# Patient Record
Sex: MALE | Race: BLACK OR AFRICAN AMERICAN | NOT HISPANIC OR LATINO | Employment: FULL TIME | URBAN - METROPOLITAN AREA
[De-identification: names, ages, dates, MRNs, and addresses within clinical notes are randomized per-mention and may not be internally consistent; named-entity substitution may affect disease eponyms.]

---

## 2018-03-21 ENCOUNTER — OFFICE VISIT (OUTPATIENT)
Dept: URGENT CARE | Facility: CLINIC | Age: 28
End: 2018-03-21
Payer: COMMERCIAL

## 2018-03-21 VITALS
BODY MASS INDEX: 27.47 KG/M2 | WEIGHT: 175 LBS | RESPIRATION RATE: 16 BRPM | OXYGEN SATURATION: 99 % | HEART RATE: 76 BPM | TEMPERATURE: 97 F | SYSTOLIC BLOOD PRESSURE: 128 MMHG | DIASTOLIC BLOOD PRESSURE: 75 MMHG | HEIGHT: 67 IN

## 2018-03-21 DIAGNOSIS — H61.23 BILATERAL IMPACTED CERUMEN: Primary | ICD-10-CM

## 2018-03-21 DIAGNOSIS — H60.339 ACUTE SWIMMER'S EAR, UNSPECIFIED LATERALITY: ICD-10-CM

## 2018-03-21 PROCEDURE — 96372 THER/PROPH/DIAG INJ SC/IM: CPT | Mod: S$GLB,,, | Performed by: INTERNAL MEDICINE

## 2018-03-21 PROCEDURE — 99213 OFFICE O/P EST LOW 20 MIN: CPT | Mod: 25,S$GLB,, | Performed by: INTERNAL MEDICINE

## 2018-03-21 RX ORDER — NEOMYCIN SULFATE, POLYMYXIN B SULFATE, HYDROCORTISONE 3.5; 10000; 1 MG/ML; [USP'U]/ML; MG/ML
4 SOLUTION/ DROPS AURICULAR (OTIC) EVERY 6 HOURS
Qty: 10 ML | Refills: 1 | Status: SHIPPED | OUTPATIENT
Start: 2018-03-21 | End: 2018-03-28

## 2018-03-21 RX ORDER — BETAMETHASONE SODIUM PHOSPHATE AND BETAMETHASONE ACETATE 3; 3 MG/ML; MG/ML
9 INJECTION, SUSPENSION INTRA-ARTICULAR; INTRALESIONAL; INTRAMUSCULAR; SOFT TISSUE ONCE
Status: COMPLETED | OUTPATIENT
Start: 2018-03-21 | End: 2018-03-21

## 2018-03-21 RX ADMIN — BETAMETHASONE SODIUM PHOSPHATE AND BETAMETHASONE ACETATE 9 MG: 3; 3 INJECTION, SUSPENSION INTRA-ARTICULAR; INTRALESIONAL; INTRAMUSCULAR; SOFT TISSUE at 07:03

## 2018-03-22 NOTE — PROGRESS NOTES
"Subjective:       Patient ID: Yash Miguel is a 27 y.o. male.    Vitals:  height is 5' 7" (1.702 m) and weight is 79.4 kg (175 lb). His temperature is 97.2 °F (36.2 °C). His blood pressure is 128/75 and his pulse is 76. His respiration is 16 and oxygen saturation is 99%.     Chief Complaint: Otalgia    Pt states headache near his left ear and believes that he may have an ear infection causing it      Otalgia    There is pain in the left ear. This is a new problem. The current episode started yesterday. Associated symptoms include ear discharge and headaches. Pertinent negatives include no abdominal pain, diarrhea, rash, sore throat or vomiting.     Review of Systems   Constitution: Negative for chills and fever.   HENT: Positive for ear discharge and ear pain. Negative for sore throat.    Eyes: Negative for blurred vision.   Cardiovascular: Negative for chest pain.   Respiratory: Negative for shortness of breath.    Skin: Negative for rash.   Musculoskeletal: Negative for back pain and joint pain.   Gastrointestinal: Negative for abdominal pain, diarrhea, nausea and vomiting.   Neurological: Positive for headaches.   Psychiatric/Behavioral: The patient is not nervous/anxious.        Objective:      Physical Exam   Constitutional: He appears well-developed and well-nourished.   HENT:   Head: Normocephalic and atraumatic.   Bilateral cerumen impactions; after cerumen removed both canals with swelling;erythema and exudate L>>R   Eyes: Conjunctivae and EOM are normal. Pupils are equal, round, and reactive to light.   Neck: Normal range of motion. Neck supple.   Nursing note and vitals reviewed.      Assessment:       1. Bilateral impacted cerumen    2. Acute swimmer's ear, unspecified laterality        Plan:         Bilateral impacted cerumen    Acute swimmer's ear, unspecified laterality  -     neomycin-polymyxin-hydrocortisone (CORTISPORIN) otic solution; Place 4 drops into both ears every 6 (six) hours.  Dispense: " 10 mL; Refill: 1  -     betamethasone acetate-betamethasone sodium phosphate injection 9 mg; Inject 1.5 mLs (9 mg total) into the muscle once.

## 2018-04-03 ENCOUNTER — OFFICE VISIT (OUTPATIENT)
Dept: URGENT CARE | Facility: CLINIC | Age: 28
End: 2018-04-03
Payer: COMMERCIAL

## 2018-04-03 VITALS
HEIGHT: 67 IN | BODY MASS INDEX: 27.47 KG/M2 | WEIGHT: 175 LBS | DIASTOLIC BLOOD PRESSURE: 69 MMHG | RESPIRATION RATE: 18 BRPM | HEART RATE: 93 BPM | SYSTOLIC BLOOD PRESSURE: 127 MMHG | TEMPERATURE: 97 F | OXYGEN SATURATION: 100 %

## 2018-04-03 DIAGNOSIS — H60.92 OTITIS EXTERNA OF LEFT EAR, UNSPECIFIED CHRONICITY, UNSPECIFIED TYPE: Primary | ICD-10-CM

## 2018-04-03 PROCEDURE — 99214 OFFICE O/P EST MOD 30 MIN: CPT | Mod: S$GLB,,, | Performed by: FAMILY MEDICINE

## 2018-04-03 RX ORDER — CIPROFLOXACIN AND DEXAMETHASONE 3; 1 MG/ML; MG/ML
SUSPENSION/ DROPS AURICULAR (OTIC)
Qty: 7.5 ML | Refills: 0 | Status: SHIPPED | OUTPATIENT
Start: 2018-04-03 | End: 2018-04-10

## 2018-04-03 RX ORDER — AMOXICILLIN AND CLAVULANATE POTASSIUM 875; 125 MG/1; MG/1
1 TABLET, FILM COATED ORAL 2 TIMES DAILY
Qty: 20 TABLET | Refills: 0 | Status: SHIPPED | OUTPATIENT
Start: 2018-04-03 | End: 2018-04-13

## 2018-04-03 NOTE — LETTER
April 3, 2018      [facility]  [address] [place] [address]  Phone: [phone]  Fax: [phone]       Patient: Yash Miguel   YOB: 1990  Date of Visit: 04/03/2018    To Whom It May Concern:    Aditya Miguel  was at [facility] on 04/03/2018. He may return to work/school on 0404/2018 with no restrictions. If you have any questions or concerns, or if I can be of further assistance, please do not hesitate to contact me.    Sincerely,          Ruba Alexander, RT

## 2018-04-03 NOTE — PATIENT INSTRUCTIONS

## 2018-04-03 NOTE — PROGRESS NOTES
"Subjective:       Patient ID: Yash Miguel is a 27 y.o. male.    Vitals:  height is 5' 7" (1.702 m) and weight is 79.4 kg (175 lb). His temperature is 97.1 °F (36.2 °C). His blood pressure is 127/69 and his pulse is 93. His respiration is 18 and oxygen saturation is 100%.     Chief Complaint: Otalgia (Left ear pain )    Otalgia    There is pain in the left ear. This is a new problem. The current episode started 1 to 4 weeks ago. The problem occurs constantly. The problem has been gradually worsening. There has been no fever. The pain is moderate. Pertinent negatives include no abdominal pain, diarrhea, headaches, rash, sore throat or vomiting. He has tried ear drops for the symptoms. The treatment provided no relief.     Review of Systems   Constitution: Negative for chills and fever.   HENT: Positive for ear pain. Negative for sore throat.    Eyes: Negative for blurred vision.   Cardiovascular: Negative for chest pain.   Respiratory: Negative for shortness of breath.    Skin: Negative for rash.   Musculoskeletal: Negative for back pain and joint pain.   Gastrointestinal: Negative for abdominal pain, diarrhea, nausea and vomiting.   Neurological: Negative for headaches.   Psychiatric/Behavioral: The patient is not nervous/anxious.        Objective:      Physical Exam   Constitutional: He is oriented to person, place, and time. He appears well-developed and well-nourished.   HENT:   Head: Normocephalic and atraumatic.   Left ear auditory canal swelling, can't visualize the TM due to swelling.  Partial ear wax build up.      Right TM and auditory canal are normal.   Eyes: EOM are normal. Pupils are equal, round, and reactive to light.   Neck: Normal range of motion. Neck supple. No JVD present. No tracheal deviation present. No thyromegaly present.   Cardiovascular: Normal rate, regular rhythm and normal heart sounds.  Exam reveals no gallop and no friction rub.    No murmur heard.  Pulmonary/Chest: Breath sounds " normal. No respiratory distress. He has no wheezes. He has no rales. He exhibits no tenderness.   Abdominal: Soft. Bowel sounds are normal. He exhibits no distension and no mass. There is no tenderness. There is no rebound and no guarding. No hernia.   Musculoskeletal: Normal range of motion. He exhibits no edema, tenderness or deformity.   Lymphadenopathy:     He has no cervical adenopathy.   Neurological: He is alert and oriented to person, place, and time. He displays normal reflexes. No cranial nerve deficit. He exhibits normal muscle tone. Coordination normal.   Skin: Skin is warm. Capillary refill takes less than 2 seconds. No rash noted. No erythema. No pallor.   Psychiatric: He has a normal mood and affect. His behavior is normal. Judgment and thought content normal.   Vitals reviewed.      Assessment:       1. Otitis externa of left ear, unspecified chronicity, unspecified type        Plan:         Otitis externa of left ear, unspecified chronicity, unspecified type  -     amoxicillin-clavulanate 875-125mg (AUGMENTIN) 875-125 mg per tablet; Take 1 tablet by mouth 2 (two) times daily.  Dispense: 20 tablet; Refill: 0  -     ciprofloxacin-dexamethasone 0.3-0.1% (CIPRODEX) 0.3-0.1 % DrpS; Apply 4 drops to the affected ear (s) twice a day for 7 days.  Dispense: 7.5 mL; Refill: 0      Follow up with your doctor in a few days as needed.  Return to the urgent care or go to the ER if symptoms get worse.    Bhupendra Frias MD

## 2018-04-19 ENCOUNTER — OFFICE VISIT (OUTPATIENT)
Dept: URGENT CARE | Facility: CLINIC | Age: 28
End: 2018-04-19
Payer: COMMERCIAL

## 2018-04-19 VITALS
TEMPERATURE: 98 F | OXYGEN SATURATION: 99 % | HEART RATE: 80 BPM | BODY MASS INDEX: 27.47 KG/M2 | HEIGHT: 67 IN | SYSTOLIC BLOOD PRESSURE: 120 MMHG | RESPIRATION RATE: 18 BRPM | DIASTOLIC BLOOD PRESSURE: 71 MMHG | WEIGHT: 175 LBS

## 2018-04-19 DIAGNOSIS — J30.2 ACUTE SEASONAL ALLERGIC RHINITIS, UNSPECIFIED TRIGGER: Primary | ICD-10-CM

## 2018-04-19 PROCEDURE — 99214 OFFICE O/P EST MOD 30 MIN: CPT | Mod: S$GLB,,, | Performed by: NURSE PRACTITIONER

## 2018-04-19 RX ORDER — FLUTICASONE PROPIONATE 50 MCG
1 SPRAY, SUSPENSION (ML) NASAL DAILY
Qty: 1 BOTTLE | Refills: 0 | Status: SHIPPED | OUTPATIENT
Start: 2018-04-19 | End: 2018-09-01

## 2018-04-19 NOTE — PATIENT INSTRUCTIONS
Use an antihistmine such as Claritin, Zyrtec or Allegra to dry you out.     Use pseudoephedrine (behind the counter) to decongest. Pseudoephedrine  30 mg up to 240 mg /day. It can raise your blood pressure and give you palpitations.    Use mucinex (guaifenisin) to break up mucous up to 2400mg/day to loosen any mucous. The mucinex DM pill has a cough suppressant that can be used at night to stop the tickle at the back of your throat.    Use Nasal Saline to mechanically move any post nasal drip from your eustachian tube or from the back of your throat.    Use Afrin in each nare for no longer than 3 days, as it is addictive. It can also dry out your mucous membranes and cause elevated blood pressure.    Use Flonase 1-2 sprays/nostril per day. It is a local acting steroid nasal spray, if you develop a bloody nose, stop using the medication immediately.    Use warm salt water gargles to ease your throat pain. Warm salt water gargles as needed for sore throat-  1/2 tsp salt to 1 cup warm water, gargle as desired.    Sometimes Nyquil at night is beneficial to help you get some rest, however it is sedating and it does have an antihistamine, and tylenol.        Allergic Rhinitis  Allergic rhinitis is an allergic reaction that affects the nose, and often the eyes. Its often known as nasal allergies. Nasal allergies are often due to things in the environment that are breathed in. Depending what you are sensitive to, nasal allergies may occur only during certain seasons. Or they may occur year round. Common indoor allergens include house dust mites, mold, cockroaches, and pet dander. Outdoor allergens include pollen from trees, grasses, and weeds.   Symptoms include a drippy, stuffy, and itchy nose. They also include sneezing and red and itchy eyes. You may feel tired more often. Severe allergies may also affect your breathing and trigger a condition called asthma.   Tests can be done to see what allergens are affecting you.  You may be referred to an allergy specialist for testing and further evaluation.  Home care  Your healthcare provider may prescribe medicines to help relieve allergy symptoms. These may include oral medicines, nasal sprays, or eye drops.  Ask your provider for advice on how to avoid substances that you are allergic to. Below are a few tips for each type of allergen.  Pet dander:  · Do not have pets with fur and feathers.  · If you can't avoid having a pet, keep it out of your bedroom and off upholstered furniture.  Pollen:  · When pollen counts are high, keep windows of your car and home closed. If possible, use an air conditioner instead.  · Wear a filter mask when mowing or doing yard work.  House dust mites:  · Wash bedding every week in warm water and detergent and dry on a hot setting.  · Cover the mattress, box spring, and pillows with allergy covers.   · If possible, sleep in a room with no carpet, curtains, or upholstered furniture.  Cockroaches:  · Store food in sealed containers.  · Remove garbage from the home promptly.  · Fix water leaks  Mold:  · Keep humidity low by using a dehumidifier or air conditioner. Keep the dehumidifier and air conditioner clean and free of mold.  · Clean moldy areas with bleach and water.  In general:  · Vacuum once or twice a week. If possible, use a vacuum with a high-efficiency particulate air (HEPA) filter.  · Do not smoke. Avoid cigarette smoke. Cigarette smoke is an irritant that can make symptoms worse.  Follow-up care  Follow up as advised by the healthcare provider or our staff. If you were referred to an allergy specialist, make this appointment promptly.  When to seek medical advice  Call your healthcare provider right away if the following occur:  · Coughing or wheezing  · Fever greater than 100.4°F (38°C)  · Hives (raised red bumps)  · Continuing symptoms, new symptoms, or worsening symptoms  Call 911 right away if you have:  · Trouble breathing  · Severe swelling  of the face or severe itching of the eyes or mouth  Date Last Reviewed: 3/1/2017  © 2200-8634 [employer] [address] [place] [address], [place] [address]. All rights reserved. This information is not intended as a substitute for professional medical care. Always follow your healthcare professional's instructions.        Seasonal Allergy  Seasonal allergy is also called hay fever. It may occur after a person is exposed to pollens released from grasses, weeds, trees and shrubs. This type of allergy occurs during the spring and summer when the pollen contacts the lining of the nose, eyes, eyelids, sinuses and throat. This causes histamine to be released from the tissues. Histamine causes itching and swelling. This may produce a watery discharge from the eyes or nose. Violent sneezing, nasal congestion, post-nasal drip, itching of the eyes, nose, throat and mouth, scratchy throat, and dry cough may also occur.  Home care  Seasonal allergy cannot be cured, but symptoms can be reduced by these measures:  · Avoid or reduce exposure to the allergen as much as you can:    ¨ Stay indoors on windy days of pollen season.   ¨ Keep windows and doors closed. Use air conditioning instead in your home and car. This filters the air.  ¨ Change air conditioner filters often.  ¨ Take a shower, wash your hair, and change clothes after being outdoors.  ¨ Put on a NIOSH-rated 95 filter mask when working outdoors. Before going outside, take your allergy medicine as advised by your healthcare provider.  · Decongestant pills and sprays reduce tissue swelling and watery discharge. Overuse of nasal decongestant sprays may make symptoms worse. Do not use these more often than recommended. Sometimes you can experience a rebound effect (symptoms worsen), when stopping them. Talk to your healthcare provider or pharmacist about these medicines before taking them, especially if you have high blood pressure or heart  problems.   · Antihistamines block the release of histamine during the allergic response. They work better when taken before symptoms develop. Unless a prescription antihistamine was prescribed, you can take over-the-counter antihistamines that do not cause drowsiness.  Ask your pharmacist for suggestions.  · Steroid nasal sprays or oral steroids may also be prescribed for more severe symptoms. These help to reduce the local inflammation that can add to the allergic response.  · If you have asthma, pollen season may make your asthma symptoms worse. It is important that you use your asthma medicines as directed during this time to prevent or treat attacks. Some persons with asthma have asthma symptoms that get worse when they take antihistamines. This is due to the drying effect on the lungs. If you notice this, stop the antihistamines, drink extra fluids and notify your doctor.  · If you have sinus congestion or drainage, a saline nasal rinse may give relief. A saline nasal rinse lessens the swelling and clears excess mucus. This allows sinuses to drain. Prepackaged kits are sold at most drug stores. These contain pre-mixed salt packets and an irrigation device.  Follow-up care  Follow up with your healthcare provider or as directed. If you have been referred to a specialist, make an appointment promptly.  When to seek medical advice  Call your healthcare provider for any of the following:  · Facial, ear or sinus pain; colored drainage from the nose  · Headaches  · You have asthma and your asthma symptoms do not respond to the usual doses of your medicine  · Cough with colored sputum (mucus)  · Fever of 100.4°F (38°C) or higher, or as directed by the healthcare provider  Call 911 if any of these occur:  · Trouble breathing or swallowing, wheezing  · Hoarse voice, trouble speaking, or drooling  · Confusion  · Very drowsy or trouble awakening  · Fainting or loss of consciousness  · Rapid heart rate, or weak  pulse  · Low blood pressure  · Feeling of doom  · Nausea, vomiting, abdominal pain, diarrhea  · Vomiting blood, or large amounts of blood in stool  · Seizure  · Cold, moist, or pale (blue in color) skin  Date Last Reviewed: 5/1/2017  © 3718-7249 [employer]. [address] [place] [address]. All rights reserved. This information is not intended as a substitute for professional medical care. Always follow your healthcare professional's instructions.

## 2018-04-19 NOTE — PROGRESS NOTES
"Subjective:       Patient ID: Yash Miguel is a 27 y.o. male.    Vitals:  height is 5' 7" (1.702 m) and weight is 79.4 kg (175 lb). His temperature is 97.6 °F (36.4 °C). His blood pressure is 120/71 and his pulse is 80. His respiration is 18 and oxygen saturation is 99%.     Chief Complaint: Sinus Problem    Pt states he was here 2 weeks ago for sinus problems, it got better but it seems to have come back. Patient reports working outside all day and does not take a daily antihistamine. He has been coming in for frequent sinus pain, congestion and discomfort over the last two months.       Sinus Problem   This is a new problem. The current episode started 1 to 4 weeks ago. The problem has been waxing and waning since onset. There has been no fever. His pain is at a severity of 4/10. The pain is mild. Associated symptoms include congestion, ear pain, headaches and a sore throat. Pertinent negatives include no chills, coughing, hoarse voice or shortness of breath. Past treatments include antibiotics. The treatment provided mild relief.     Review of Systems   Constitution: Negative for chills, fever and malaise/fatigue.   HENT: Positive for congestion, ear pain and sore throat. Negative for hoarse voice.    Eyes: Negative for discharge and redness.   Cardiovascular: Negative for chest pain, dyspnea on exertion and leg swelling.   Respiratory: Negative for cough, shortness of breath, sputum production and wheezing.    Musculoskeletal: Negative for myalgias.   Gastrointestinal: Negative for abdominal pain and nausea.   Neurological: Positive for headaches.       Objective:      Physical Exam   Constitutional: He is oriented to person, place, and time. Vital signs are normal. He appears well-developed and well-nourished. He is cooperative.  Non-toxic appearance. He does not appear ill. No distress.   HENT:   Head: Normocephalic and atraumatic.   Right Ear: Hearing, tympanic membrane, external ear and ear canal normal. "   Left Ear: Hearing, tympanic membrane, external ear and ear canal normal.   Nose: Mucosal edema, rhinorrhea and sinus tenderness present. No nasal deformity. No epistaxis. Right sinus exhibits maxillary sinus tenderness and frontal sinus tenderness. Left sinus exhibits maxillary sinus tenderness and frontal sinus tenderness.   Mouth/Throat: Uvula is midline, oropharynx is clear and moist and mucous membranes are normal. No trismus in the jaw. Normal dentition. No uvula swelling. No posterior oropharyngeal erythema.   Eyes: Conjunctivae, EOM and lids are normal. Pupils are equal, round, and reactive to light. No scleral icterus.   Sclera clear bilat   Neck: Trachea normal, full passive range of motion without pain and phonation normal. Neck supple.   Cardiovascular: Normal rate, regular rhythm, normal heart sounds, intact distal pulses and normal pulses.    Pulmonary/Chest: Effort normal and breath sounds normal. No respiratory distress.   Abdominal: Soft. Normal appearance and bowel sounds are normal. He exhibits no distension. There is no tenderness.   Musculoskeletal: Normal range of motion. He exhibits no edema or deformity.   Neurological: He is alert and oriented to person, place, and time. He exhibits normal muscle tone. Coordination normal.   Skin: Skin is warm, dry and intact. Capillary refill takes less than 2 seconds. He is not diaphoretic. No pallor.   Psychiatric: He has a normal mood and affect. His speech is normal and behavior is normal. Judgment and thought content normal. Cognition and memory are normal.   Nursing note and vitals reviewed.      Assessment:       1. Acute seasonal allergic rhinitis, unspecified trigger        Plan:         Offered to refer patient to ENT and states he will try this regimen at this point in time. Had a long discussion about infections verus environmental allergies and patient agrees.     Acute seasonal allergic rhinitis, unspecified trigger  -     fluticasone  (FLONASE) 50 mcg/actuation nasal spray; 1 spray (50 mcg total) by Each Nare route once daily.  Dispense: 1 Bottle; Refill: 0  -     loratadine-pseudoephedrine 5-120 mg (CLARITIN-D 12 HOUR) 5-120 mg per tablet; Take 1 tablet by mouth 2 (two) times daily.  Dispense: 60 tablet; Refill: 0      Patient Instructions   Use an antihistmine such as Claritin, Zyrtec or Allegra to dry you out.     Use pseudoephedrine (behind the counter) to decongest. Pseudoephedrine  30 mg up to 240 mg /day. It can raise your blood pressure and give you palpitations.    Use mucinex (guaifenisin) to break up mucous up to 2400mg/day to loosen any mucous. The mucinex DM pill has a cough suppressant that can be used at night to stop the tickle at the back of your throat.    Use Nasal Saline to mechanically move any post nasal drip from your eustachian tube or from the back of your throat.    Use Afrin in each nare for no longer than 3 days, as it is addictive. It can also dry out your mucous membranes and cause elevated blood pressure.    Use Flonase 1-2 sprays/nostril per day. It is a local acting steroid nasal spray, if you develop a bloody nose, stop using the medication immediately.    Use warm salt water gargles to ease your throat pain. Warm salt water gargles as needed for sore throat-  1/2 tsp salt to 1 cup warm water, gargle as desired.    Sometimes Nyquil at night is beneficial to help you get some rest, however it is sedating and it does have an antihistamine, and tylenol.        Allergic Rhinitis  Allergic rhinitis is an allergic reaction that affects the nose, and often the eyes. Its often known as nasal allergies. Nasal allergies are often due to things in the environment that are breathed in. Depending what you are sensitive to, nasal allergies may occur only during certain seasons. Or they may occur year round. Common indoor allergens include house dust mites, mold, cockroaches, and pet dander. Outdoor allergens include pollen  from trees, grasses, and weeds.   Symptoms include a drippy, stuffy, and itchy nose. They also include sneezing and red and itchy eyes. You may feel tired more often. Severe allergies may also affect your breathing and trigger a condition called asthma.   Tests can be done to see what allergens are affecting you. You may be referred to an allergy specialist for testing and further evaluation.  Home care  Your healthcare provider may prescribe medicines to help relieve allergy symptoms. These may include oral medicines, nasal sprays, or eye drops.  Ask your provider for advice on how to avoid substances that you are allergic to. Below are a few tips for each type of allergen.  Pet dander:  · Do not have pets with fur and feathers.  · If you can't avoid having a pet, keep it out of your bedroom and off upholstered furniture.  Pollen:  · When pollen counts are high, keep windows of your car and home closed. If possible, use an air conditioner instead.  · Wear a filter mask when mowing or doing yard work.  House dust mites:  · Wash bedding every week in warm water and detergent and dry on a hot setting.  · Cover the mattress, box spring, and pillows with allergy covers.   · If possible, sleep in a room with no carpet, curtains, or upholstered furniture.  Cockroaches:  · Store food in sealed containers.  · Remove garbage from the home promptly.  · Fix water leaks  Mold:  · Keep humidity low by using a dehumidifier or air conditioner. Keep the dehumidifier and air conditioner clean and free of mold.  · Clean moldy areas with bleach and water.  In general:  · Vacuum once or twice a week. If possible, use a vacuum with a high-efficiency particulate air (HEPA) filter.  · Do not smoke. Avoid cigarette smoke. Cigarette smoke is an irritant that can make symptoms worse.  Follow-up care  Follow up as advised by the healthcare provider or our staff. If you were referred to an allergy specialist, make this appointment  promptly.  When to seek medical advice  Call your healthcare provider right away if the following occur:  · Coughing or wheezing  · Fever greater than 100.4°F (38°C)  · Hives (raised red bumps)  · Continuing symptoms, new symptoms, or worsening symptoms  Call 911 right away if you have:  · Trouble breathing  · Severe swelling of the face or severe itching of the eyes or mouth  Date Last Reviewed: 3/1/2017  © 8539-2743 [employer]. [address] [place] [id number]. All rights reserved. This information is not intended as a substitute for professional medical care. Always follow your healthcare professional's instructions.        Seasonal Allergy  Seasonal allergy is also called hay fever. It may occur after a person is exposed to pollens released from grasses, weeds, trees and shrubs. This type of allergy occurs during the spring and summer when the pollen contacts the lining of the nose, eyes, eyelids, sinuses and throat. This causes histamine to be released from the tissues. Histamine causes itching and swelling. This may produce a watery discharge from the eyes or nose. Violent sneezing, nasal congestion, post-nasal drip, itching of the eyes, nose, throat and mouth, scratchy throat, and dry cough may also occur.  Home care  Seasonal allergy cannot be cured, but symptoms can be reduced by these measures:  · Avoid or reduce exposure to the allergen as much as you can:    ¨ Stay indoors on windy days of pollen season.   ¨ Keep windows and doors closed. Use air conditioning instead in your home and car. This filters the air.  ¨ Change air conditioner filters often.  ¨ Take a shower, wash your hair, and change clothes after being outdoors.  ¨ Put on a NIOSH-rated 95 filter mask when working outdoors. Before going outside, take your allergy medicine as advised by your healthcare provider.  · Decongestant pills and sprays reduce tissue swelling and watery discharge. Overuse of nasal decongestant  sprays may make symptoms worse. Do not use these more often than recommended. Sometimes you can experience a rebound effect (symptoms worsen), when stopping them. Talk to your healthcare provider or pharmacist about these medicines before taking them, especially if you have high blood pressure or heart problems.   · Antihistamines block the release of histamine during the allergic response. They work better when taken before symptoms develop. Unless a prescription antihistamine was prescribed, you can take over-the-counter antihistamines that do not cause drowsiness.  Ask your pharmacist for suggestions.  · Steroid nasal sprays or oral steroids may also be prescribed for more severe symptoms. These help to reduce the local inflammation that can add to the allergic response.  · If you have asthma, pollen season may make your asthma symptoms worse. It is important that you use your asthma medicines as directed during this time to prevent or treat attacks. Some persons with asthma have asthma symptoms that get worse when they take antihistamines. This is due to the drying effect on the lungs. If you notice this, stop the antihistamines, drink extra fluids and notify your doctor.  · If you have sinus congestion or drainage, a saline nasal rinse may give relief. A saline nasal rinse lessens the swelling and clears excess mucus. This allows sinuses to drain. Prepackaged kits are sold at most drug stores. These contain pre-mixed salt packets and an irrigation device.  Follow-up care  Follow up with your healthcare provider or as directed. If you have been referred to a specialist, make an appointment promptly.  When to seek medical advice  Call your healthcare provider for any of the following:  · Facial, ear or sinus pain; colored drainage from the nose  · Headaches  · You have asthma and your asthma symptoms do not respond to the usual doses of your medicine  · Cough with colored sputum (mucus)  · Fever of 100.4°F (38°C)  or higher, or as directed by the healthcare provider  Call 911 if any of these occur:  · Trouble breathing or swallowing, wheezing  · Hoarse voice, trouble speaking, or drooling  · Confusion  · Very drowsy or trouble awakening  · Fainting or loss of consciousness  · Rapid heart rate, or weak pulse  · Low blood pressure  · Feeling of doom  · Nausea, vomiting, abdominal pain, diarrhea  · Vomiting blood, or large amounts of blood in stool  · Seizure  · Cold, moist, or pale (blue in color) skin  Date Last Reviewed: 5/1/2017  © 8589-0718 [employer]. [address] [place] [id number]. All rights reserved. This information is not intended as a substitute for professional medical care. Always follow your healthcare professional's instructions.

## 2018-07-26 ENCOUNTER — OFFICE VISIT (OUTPATIENT)
Dept: URGENT CARE | Facility: CLINIC | Age: 28
End: 2018-07-26
Payer: COMMERCIAL

## 2018-07-26 VITALS
OXYGEN SATURATION: 100 % | BODY MASS INDEX: 27.47 KG/M2 | SYSTOLIC BLOOD PRESSURE: 128 MMHG | WEIGHT: 175 LBS | TEMPERATURE: 98 F | HEART RATE: 74 BPM | DIASTOLIC BLOOD PRESSURE: 67 MMHG | RESPIRATION RATE: 16 BRPM | HEIGHT: 67 IN

## 2018-07-26 DIAGNOSIS — K04.7 DENTAL ABSCESS: Primary | ICD-10-CM

## 2018-07-26 PROCEDURE — 3008F BODY MASS INDEX DOCD: CPT | Mod: CPTII,S$GLB,, | Performed by: INTERNAL MEDICINE

## 2018-07-26 PROCEDURE — 99213 OFFICE O/P EST LOW 20 MIN: CPT | Mod: S$GLB,,, | Performed by: INTERNAL MEDICINE

## 2018-07-26 RX ORDER — HYDROCODONE BITARTRATE AND ACETAMINOPHEN 7.5; 325 MG/1; MG/1
1 TABLET ORAL EVERY 6 HOURS PRN
Qty: 20 TABLET | Refills: 0 | OUTPATIENT
Start: 2018-07-26 | End: 2018-09-01

## 2018-07-26 RX ORDER — PENICILLIN V POTASSIUM 500 MG/1
500 TABLET, FILM COATED ORAL EVERY 6 HOURS
Qty: 28 TABLET | Refills: 0 | Status: SHIPPED | OUTPATIENT
Start: 2018-07-26 | End: 2018-09-01

## 2018-07-27 NOTE — PROGRESS NOTES
"Subjective:       Patient ID: Yash Miguel is a 28 y.o. male.    Vitals:  height is 5' 7" (1.702 m) and weight is 79.4 kg (175 lb). His temperature is 97.6 °F (36.4 °C). His blood pressure is 128/67 and his pulse is 74. His respiration is 16 and oxygen saturation is 100%.     Chief Complaint: Facial Pain    Right side facial pain and swelling that began about a week ago. Pt thinks his wisdom tooth is the cause of the pain and swelling      Facial Pain   This is a new problem. The current episode started in the past 7 days. The problem occurs constantly. The problem has been gradually worsening. Pertinent negatives include no abdominal pain, chest pain, chills, fever, headaches, nausea, rash, sore throat or vomiting. The symptoms are aggravated by eating. He has tried acetaminophen for the symptoms. The treatment provided no relief.     Review of Systems   Constitution: Negative for chills and fever.   HENT: Negative for sore throat.    Eyes: Negative for blurred vision.   Cardiovascular: Negative for chest pain.   Respiratory: Negative for shortness of breath.    Skin: Negative for rash.   Musculoskeletal: Negative for back pain and joint pain.   Gastrointestinal: Negative for abdominal pain, diarrhea, nausea and vomiting.   Neurological: Negative for headaches.   Psychiatric/Behavioral: The patient is not nervous/anxious.        Objective:      Physical Exam   Constitutional: He appears well-developed and well-nourished.   HENT:   Head: Normocephalic and atraumatic.   Mouth/Throat:       Eyes: Conjunctivae and EOM are normal. Pupils are equal, round, and reactive to light.   Neck: Normal range of motion. Neck supple.   Nursing note and vitals reviewed.      Assessment:       1. Dental abscess        Plan:         Dental abscess  -     penicillin v potassium (VEETID) 500 MG tablet; Take 1 tablet (500 mg total) by mouth every 6 (six) hours.  Dispense: 28 tablet; Refill: 0  -     HYDROcodone-acetaminophen (NORCO) " 7.5-325 mg per tablet; Take 1 tablet by mouth every 6 (six) hours as needed for Pain.  Dispense: 20 tablet; Refill: 0

## 2018-09-01 ENCOUNTER — HOSPITAL ENCOUNTER (EMERGENCY)
Facility: OTHER | Age: 28
Discharge: HOME | End: 2018-09-01
Attending: EMERGENCY MEDICINE
Payer: COMMERCIAL

## 2018-09-01 VITALS
TEMPERATURE: 98 F | RESPIRATION RATE: 17 BRPM | OXYGEN SATURATION: 99 % | HEART RATE: 65 BPM | BODY MASS INDEX: 28.12 KG/M2 | DIASTOLIC BLOOD PRESSURE: 59 MMHG | HEIGHT: 66 IN | WEIGHT: 175 LBS | SYSTOLIC BLOOD PRESSURE: 131 MMHG

## 2018-09-01 DIAGNOSIS — M25.562 KNEE PAIN, LEFT: ICD-10-CM

## 2018-09-01 DIAGNOSIS — R22.1 MASS OF LEFT SIDE OF NECK: Primary | ICD-10-CM

## 2018-09-01 DIAGNOSIS — S29.012A UPPER BACK STRAIN, INITIAL ENCOUNTER: ICD-10-CM

## 2018-09-01 PROCEDURE — 99283 EMERGENCY DEPT VISIT LOW MDM: CPT

## 2018-09-01 PROCEDURE — 25000003 PHARM REV CODE 250: Performed by: EMERGENCY MEDICINE

## 2018-09-01 RX ORDER — IBUPROFEN 600 MG/1
600 TABLET ORAL
Status: COMPLETED | OUTPATIENT
Start: 2018-09-01 | End: 2018-09-01

## 2018-09-01 RX ORDER — IBUPROFEN 600 MG/1
600 TABLET ORAL EVERY 6 HOURS PRN
Qty: 30 TABLET | Refills: 0 | OUTPATIENT
Start: 2018-09-01 | End: 2020-07-14

## 2018-09-01 RX ADMIN — IBUPROFEN 600 MG: 600 TABLET ORAL at 09:09

## 2018-09-01 NOTE — ED PROVIDER NOTES
"Encounter Date: 9/1/2018    SCRIBE #1 NOTE: I, Nimisha Arauz, am scribing for, and in the presence of, Dr. Hebert.       History     Chief Complaint   Patient presents with    Neck Swelling     Pt c/o a lump to his neck since this summer that has progressively been getting larger. Denies pain. Also c/o left knee pain intermittently for the past few months and upper back for the past month     Time seen by provider: 9:17 AM    This is a 28 y.o. male who presents with complaint of neck swelling. The patient noticed an area of swelling on the left side of his neck months ago, but states that is has become larger in the last two weeks. The area of swelling becomes painful with direct pressure. He was diagnosed with cyst and had it surgically removed at [facility] when this previously occurred ten years ago. He denies history of thyroid problems. Pt denies fever, chills, neck pain, neck stiffness, or sore throat.    Pt also mentions left upper back pain and swelling to the left knee. The back pain currently rates 7/10, becomes better with rest, and worsens with movement. He reports heavy lifting using his left arm while working at [employer] approximately six days each week. He has noticed left knee swelling for several weeks and "just wants to get rid of it." Pt denies use of OTC medication. He is right hand dominant. He denies left knee pain, gait problem, numbness, or tingling.      The history is provided by the patient.     Review of patient's allergies indicates:  No Known Allergies  History reviewed. No pertinent past medical history.  History reviewed. No pertinent surgical history.  History reviewed. No pertinent family history.  Social History     Tobacco Use    Smoking status: Never Smoker    Smokeless tobacco: Never Used   Substance Use Topics    Alcohol use: No    Drug use: No     Review of Systems   Constitutional: Negative for chills and fever.   HENT: Negative for sore throat.    Respiratory: " Negative for shortness of breath.    Cardiovascular: Negative for chest pain.   Gastrointestinal: Negative for nausea.   Genitourinary: Negative for dysuria.   Musculoskeletal: Positive for back pain. Negative for gait problem, neck pain and neck stiffness.        Positive for swelling to the left knee and swelling. Negative for left knee pain.    Skin: Negative for rash.   Neurological: Negative for weakness and numbness.        Negative for tingling.   Hematological: Does not bruise/bleed easily.       Physical Exam     Initial Vitals [09/01/18 0823]   BP Pulse Resp Temp SpO2   125/66 62 18 98 °F (36.7 °C) 99 %      MAP       --         Physical Exam    Nursing note and vitals reviewed.  Constitutional: He appears well-developed and well-nourished. He is not diaphoretic. No distress.   HENT:   Head: Normocephalic and atraumatic.   Eyes: EOM are normal. Pupils are equal, round, and reactive to light.   Neck: Normal range of motion. Neck supple.   Cardiovascular: Normal rate, regular rhythm and normal heart sounds. Exam reveals no gallop and no friction rub.    No murmur heard.  Pulmonary/Chest: Breath sounds normal. No respiratory distress. He has no wheezes. He has no rhonchi. He has no rales.   Abdominal: Soft. Bowel sounds are normal. He exhibits no distension. There is no tenderness. There is no rebound and no guarding.   Musculoskeletal: Normal range of motion. He exhibits tenderness. He exhibits no edema.   Tenderness to the left mid-thoracic paraspinal muscles. L knee- Left suprapatellar edema. No focal knee tenderness or joint effusion.    Neurological: He is alert and oriented to person, place, and time.   Skin: Skin is warm and dry. No rash noted. No erythema. No pallor.   Left cystic thyroid mass, approximately 10 cm in diameter, with no tenderness or erythema.    Psychiatric: He has a normal mood and affect. His behavior is normal. Judgment and thought content normal.         ED Course    Procedures  Labs Reviewed - No data to display   Imaging Results          X-Ray Knee 3 View Left (Final result)  Result time 09/01/18 09:44:44    Final result by Minerva Hamilton MD (09/01/18 09:44:44)                 Impression:      As above.      Electronically signed by: Minerva Hamilton MD  Date:    09/01/2018  Time:    09:44             Narrative:    EXAMINATION:  XR KNEE 3 VIEW LEFT    CLINICAL HISTORY:  Pain in left knee    TECHNIQUE:  AP, lateral, and Merchant views of the left knee were performed.    COMPARISON:  None    FINDINGS:  No acute fracture or bony destructive process is seen.  Alignment is normal.  No significant hypertrophic degenerative change.  There is a small suprapatellar effusion.                                     X-Rays:   Independently Interpreted Readings:   Other Readings:  Left knee: Small suprapatellar effusion.  No acute fracture    Medical Decision Making:   Initial Assessment:       28 year old male presents with multiple complaints:  1) left neck mass- he has area of enlargement left lateral thyroid area, 2 tiny underlying mobile nodules.  History of hypo or hyperthyroidism and no symptoms to suggest this.  He had similar cyst removed 10 years ago at [facility], but unable to get access records of this.  Could be thyroglossal duct cyst, versus ectopic thyroid tissue.  Will need further workup including CT and possible ultrasound with ENT Clinic, will refer to ENT.  No sign abscess or infection out warrant emergent imaging or drainage.   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  Tenderness over paraspinal muscles, no bone tenderness or sign of fracture.  Likely muscle strain, will Rx ibuprofen and advised less heavy lifting using that arm.  3) left knee swelling- exam with small suprapatellar effusion, confirmed by x-ray with no fractures or other acute process.  No sign joint effusion or septic  arthritis.  This is also likely related to his job, he is advised to take NSAIDs and supportive care.    I had extensive discussion about workup results and discharge plan. Patient advised to closely follow-up with ENT and return for the ED for any concerns.     Clinical Tests:   Radiological Study: Reviewed and Ordered            Scribe Attestation:   Scribe #1: I performed the above scribed service and the documentation accurately describes the services I performed. I attest to the accuracy of the note.    Attending Attestation:           Physician Attestation for Scribe:  Physician Attestation Statement for Scribe #1: I, Dr. Hebert, reviewed documentation, as scribed by Nimisha Arauz in my presence, and it is both accurate and complete.                    Clinical Impression:     1. Mass of left side of neck    2. Knee pain, left    3. Upper back strain, initial encounter                                   Duarte Hebert MD  09/01/18 0087

## 2018-09-01 NOTE — ED NOTES
Patient Identifiers for Yash Miguel checked and correct  LOC: The patient is awake, alert and aware of environment with an appropriate affect, the patient is oriented x 3 and speaking appropriate.  APPEARANCE: Patient resting comfortably and in no acute distress. The patient is clean and well groomed. The patient's clothing is properly fastened.  SKIN: The skin is warm and dry. The patient has normal skin turgor and moist mucus membranes. No rashes or lesions upon observation. Skin Intact , no breakdown noted.  Musculoskeletal :  Normal range of motion noted. Moves all extremities well, left knee pain  RESPIRATORY: Airway is open and patent, respirations are spontaneous, patient has a normal effort and rate. Breath sounds are clear & equal, bilaterally.  CARDIAC: Patient has a normal rate and rhythm, no peripheral edema noted, capillary refill < 3 seconds.     PULSES: 2+ radial & pedal pulses, symmetrical in all extremities.  NEUROLOGIC: PERRL, Pupils 3mm and reacts briskly to light. Motor strength 5/5 all extremities.  The pt's facial expression is symmetrical, patient moving all extremities, normal sensation in all extremities when touched with a finger.The patient is awake, alert and cooperative with a calm affect, patient is aware of environment.      Will continue to monitor

## 2019-02-04 ENCOUNTER — OFFICE VISIT (OUTPATIENT)
Dept: URGENT CARE | Facility: CLINIC | Age: 29
End: 2019-02-04
Payer: COMMERCIAL

## 2019-02-04 VITALS
TEMPERATURE: 98 F | HEART RATE: 75 BPM | SYSTOLIC BLOOD PRESSURE: 125 MMHG | DIASTOLIC BLOOD PRESSURE: 68 MMHG | HEIGHT: 66 IN | BODY MASS INDEX: 28.12 KG/M2 | WEIGHT: 175 LBS | OXYGEN SATURATION: 99 %

## 2019-02-04 DIAGNOSIS — M54.9 UPPER BACK PAIN: Primary | ICD-10-CM

## 2019-02-04 PROCEDURE — 3008F PR BODY MASS INDEX (BMI) DOCUMENTED: ICD-10-PCS | Mod: CPTII,S$GLB,, | Performed by: FAMILY MEDICINE

## 2019-02-04 PROCEDURE — 3008F BODY MASS INDEX DOCD: CPT | Mod: CPTII,S$GLB,, | Performed by: FAMILY MEDICINE

## 2019-02-04 PROCEDURE — 99214 OFFICE O/P EST MOD 30 MIN: CPT | Mod: S$GLB,,, | Performed by: FAMILY MEDICINE

## 2019-02-04 PROCEDURE — 99214 PR OFFICE/OUTPT VISIT, EST, LEVL IV, 30-39 MIN: ICD-10-PCS | Mod: S$GLB,,, | Performed by: FAMILY MEDICINE

## 2019-02-04 RX ORDER — CYCLOBENZAPRINE HCL 10 MG
10 TABLET ORAL NIGHTLY
Qty: 30 TABLET | Refills: 0 | Status: SHIPPED | OUTPATIENT
Start: 2019-02-04 | End: 2019-03-06

## 2019-02-04 RX ORDER — IBUPROFEN 800 MG/1
800 TABLET ORAL 3 TIMES DAILY
Qty: 30 TABLET | Refills: 0 | Status: SHIPPED | OUTPATIENT
Start: 2019-02-04 | End: 2019-02-14

## 2019-02-04 NOTE — PROGRESS NOTES
"Subjective:       Patient ID: Yash Miguel is a 28 y.o. male.    Vitals:  height is 5' 6" (1.676 m) and weight is 79.4 kg (175 lb). His oral temperature is 98.1 °F (36.7 °C). His blood pressure is 125/68 and his pulse is 75. His oxygen saturation is 99%.     Chief Complaint: Back Pain    Pt works at [employer] and has had sore neck and upper back for a month or two. He was working in the yard and used cutters and aggrevated neck and back more.       Back Pain   This is a recurrent problem. The current episode started yesterday. The problem occurs constantly. The problem has been gradually worsening since onset. The pain is present in the thoracic spine. The quality of the pain is described as aching and cramping. The pain does not radiate. The pain is at a severity of 7/10. The pain is moderate. The pain is the same all the time. The symptoms are aggravated by twisting. Stiffness is present all day. Pertinent negatives include no abdominal pain, bladder incontinence, bowel incontinence, dysuria or numbness. Risk factors include poor posture. Treatments tried: Nyquil  The treatment provided no relief.       Constitution: Negative for fatigue.   Gastrointestinal: Negative for abdominal pain and bowel incontinence.   Genitourinary: Negative for dysuria, urgency, bladder incontinence and hematuria.   Musculoskeletal: Positive for trauma and back pain. Negative for muscle cramps and history of spine disorder.   Skin: Negative for rash and erythema.   Neurological: Negative for coordination disturbances, numbness and tingling.       Objective:      Physical Exam   Constitutional: He is oriented to person, place, and time. He appears well-developed and well-nourished.   HENT:   Head: Normocephalic and atraumatic.   Right Ear: External ear normal.   Left Ear: External ear normal.   Eyes: EOM are normal. Pupils are equal, round, and reactive to light.   Neck: Normal range of motion. Neck supple.   Cardiovascular: Normal " rate, regular rhythm and normal heart sounds.   No murmur heard.  Pulmonary/Chest: Breath sounds normal. No respiratory distress. He has no wheezes. He has no rales.   Abdominal: Soft. Bowel sounds are normal. He exhibits no distension. There is no tenderness. There is no rebound and no guarding.   Musculoskeletal:        Back:    Upper back muscles tightness/tenderness.  No cspine or tspine tenderness.  No redness, no bruise, no warmth.  Neck has good ROM   Lymphadenopathy:     He has no cervical adenopathy.   Neurological: He is alert and oriented to person, place, and time. He displays normal reflexes. No cranial nerve deficit. He exhibits normal muscle tone. Coordination normal.   Skin: Skin is warm. Capillary refill takes less than 2 seconds. No rash noted. No erythema. No pallor.   Psychiatric: He has a normal mood and affect. His behavior is normal. Judgment and thought content normal.   Vitals reviewed.      Assessment:       1. Upper back pain        Plan:         Upper back pain  -     cyclobenzaprine (FLEXERIL) 10 MG tablet; Take 1 tablet (10 mg total) by mouth every evening.  Dispense: 30 tablet; Refill: 0  -     ibuprofen (ADVIL,MOTRIN) 800 MG tablet; Take 1 tablet (800 mg total) by mouth 3 (three) times daily. for 10 days  Dispense: 30 tablet; Refill: 0          Patient Instructions     Back Pain (Acute or Chronic)    Back pain is one of the most common problems. The good news is that most people feel better in 1 to 2 weeks, and most of the rest in 1 to 2 months. Most people can remain active.  People experience and describe pain differently; not everyone is the same.  · The pain can be sharp, stabbing, shooting, aching, cramping or burning.  · Movement, standing, bending, lifting, sitting, or walking may worsen pain.  · It can be localized to one spot or area, or it can be more generalized.  · It can spread or radiate upwards, to the front, or go down your arms or legs (sciatica).  · It can cause  muscle spasm.  Most of the time, mechanical problems with the muscles or spine cause the pain. Mechanical problems are usually caused by an injury to the muscles or ligaments. While illness can cause back pain, it is usually not caused by a serious illness. Mechanical problems include:   · Physical activity such as sports, exercise, work, or normal activity  · Overexertion, lifting, pushing, pulling incorrectly or too aggressively  · Sudden twisting, bending, or stretching from an accident, or accidental movement  · Poor posture  · Stretching or moving wrong, without noticing pain at the time  · Poor coordination, lack of regular exercise (check with your doctor about this)  · Spinal disc disease or arthritis  · Stress  Pain can also be related to pregnancy, or illness like appendicitis, bladder or kidney infections, pelvic infections, and many other things.  Acute back pain usually gets better in 1 to 2 weeks. Back pain related to disk disease, arthritis in the spinal joints or spinal stenosis (narrowing of the spinal canal) can become chronic and last for months or years.  Unless you had a physical injury (for example, a car accident or fall) X-rays are usually not needed for the initial evaluation of back pain. If pain continues and does not respond to medical treatment, X-rays and other tests may be needed.  Home care  Try these home care recommendations:  · When in bed, try to find a position of comfort. A firm mattress is best. Try lying flat on your back with pillows under your knees. You can also try lying on your side with your knees bent up towards your chest and a pillow between your knees.  · At first, do not try to stretch out the sore spots. If there is a strain, it is not like the good soreness you get after exercising without an injury. In this case, stretching may make it worse.  · Avoid prolong sitting, long car rides, or travel. This puts more stress on the lower back than standing or  walking.  · During the first 24 to 72 hours after an acute injury or flare up of chronic back pain, apply an ice pack to the painful area for 20 minutes and then remove it for 20 minutes. Do this over a period of 60 to 90 minutes or several times a day. This will reduce swelling and pain. Wrap the ice pack in a thin towel or plastic to protect your skin.  · You can start with ice, then switch to heat. Heat (hot shower, hot bath, or heating pad) reduces pain and works well for muscle spasms. Heat can be applied to the painful area for 20 minutes then remove it for 20 minutes. Do this over a period of 60 to 90 minutes or several times a day. Do not sleep on a heating pad. It can lead to skin burns or tissue damage.  · You can alternate ice and heat therapy. Talk with your doctor about the best treatment for your back pain.  · Therapeutic massage can help relax the back muscles without stretching them.  · Be aware of safe lifting methods and do not lift anything without stretching first.  Medicines  Talk to your doctor before using medicine, especially if you have other medical problems or are taking other medicines.  · You may use over-the-counter medicine as directed on the bottle to control pain, unless another pain medicine was prescribed. If you have chronic conditions like diabetes, liver or kidney disease, stomach ulcers, or gastrointestinal bleeding, or are taking blood thinners, talk to your doctor before taking any medicine.  · Be careful if you are given a prescription medicines, narcotics, or medicine for muscle spasms. They can cause drowsiness, affect your coordination, reflexes, and judgement. Do not drive or operate heavy machinery.  Follow-up care  Follow up with your healthcare provider, or as advised.   A radiologist will review any X-rays that were taken. Your provide will notify you of any new findings that may affect your care.  Call 911  Call emergency services if any of the following  occur:  · Trouble breathing  · Confusion  · Very drowsy or trouble awakening  · Fainting or loss of consciousness  · Rapid or very slow heart rate  · Loss of bowel or bladder control  When to seek medical advice  Call your healthcare provider right away if any of these occur:   · Pain becomes worse or spreads to your legs  · Weakness or numbness in one or both legs  · Numbness in the groin or genital area  Date Last Reviewed: 7/1/2016 © 2000-2017 [employer]. [address] [place] [address]. All rights reserved. This information is not intended as a substitute for professional medical care. Always follow your healthcare professional's instructions.    Follow up with your doctor in a few days as needed.  Return to the urgent care or go to the ER if symptoms get worse.    Bhupendra Frias MD

## 2019-02-04 NOTE — LETTER
February 4, 2019      [facility] [place]  [address]  [place] [address]  Phone: [phone]  Fax: [phone]       Patient: Yash Miguel   YOB: 1990  Date of Visit: 02/04/2019    To Whom It May Concern:    Aditya Miguel  was at [facility] on 02/04/2019. He may return to work/school on 2/5/19 with restrictions. Light duty for 3 days.  No lifting/pushing/pulling more than 20 Lbs. If you have any questions or concerns, or if I can be of further assistance, please do not hesitate to contact me.    Sincerely,    Bhupendra Frias MD

## 2019-02-04 NOTE — PATIENT INSTRUCTIONS
Back Pain (Acute or Chronic)    Back pain is one of the most common problems. The good news is that most people feel better in 1 to 2 weeks, and most of the rest in 1 to 2 months. Most people can remain active.  People experience and describe pain differently; not everyone is the same.  · The pain can be sharp, stabbing, shooting, aching, cramping or burning.  · Movement, standing, bending, lifting, sitting, or walking may worsen pain.  · It can be localized to one spot or area, or it can be more generalized.  · It can spread or radiate upwards, to the front, or go down your arms or legs (sciatica).  · It can cause muscle spasm.  Most of the time, mechanical problems with the muscles or spine cause the pain. Mechanical problems are usually caused by an injury to the muscles or ligaments. While illness can cause back pain, it is usually not caused by a serious illness. Mechanical problems include:   · Physical activity such as sports, exercise, work, or normal activity  · Overexertion, lifting, pushing, pulling incorrectly or too aggressively  · Sudden twisting, bending, or stretching from an accident, or accidental movement  · Poor posture  · Stretching or moving wrong, without noticing pain at the time  · Poor coordination, lack of regular exercise (check with your doctor about this)  · Spinal disc disease or arthritis  · Stress  Pain can also be related to pregnancy, or illness like appendicitis, bladder or kidney infections, pelvic infections, and many other things.  Acute back pain usually gets better in 1 to 2 weeks. Back pain related to disk disease, arthritis in the spinal joints or spinal stenosis (narrowing of the spinal canal) can become chronic and last for months or years.  Unless you had a physical injury (for example, a car accident or fall) X-rays are usually not needed for the initial evaluation of back pain. If pain continues and does not respond to medical treatment, X-rays and other tests may be  needed.  Home care  Try these home care recommendations:  · When in bed, try to find a position of comfort. A firm mattress is best. Try lying flat on your back with pillows under your knees. You can also try lying on your side with your knees bent up towards your chest and a pillow between your knees.  · At first, do not try to stretch out the sore spots. If there is a strain, it is not like the good soreness you get after exercising without an injury. In this case, stretching may make it worse.  · Avoid prolong sitting, long car rides, or travel. This puts more stress on the lower back than standing or walking.  · During the first 24 to 72 hours after an acute injury or flare up of chronic back pain, apply an ice pack to the painful area for 20 minutes and then remove it for 20 minutes. Do this over a period of 60 to 90 minutes or several times a day. This will reduce swelling and pain. Wrap the ice pack in a thin towel or plastic to protect your skin.  · You can start with ice, then switch to heat. Heat (hot shower, hot bath, or heating pad) reduces pain and works well for muscle spasms. Heat can be applied to the painful area for 20 minutes then remove it for 20 minutes. Do this over a period of 60 to 90 minutes or several times a day. Do not sleep on a heating pad. It can lead to skin burns or tissue damage.  · You can alternate ice and heat therapy. Talk with your doctor about the best treatment for your back pain.  · Therapeutic massage can help relax the back muscles without stretching them.  · Be aware of safe lifting methods and do not lift anything without stretching first.  Medicines  Talk to your doctor before using medicine, especially if you have other medical problems or are taking other medicines.  · You may use over-the-counter medicine as directed on the bottle to control pain, unless another pain medicine was prescribed. If you have chronic conditions like diabetes, liver or kidney disease,  stomach ulcers, or gastrointestinal bleeding, or are taking blood thinners, talk to your doctor before taking any medicine.  · Be careful if you are given a prescription medicines, narcotics, or medicine for muscle spasms. They can cause drowsiness, affect your coordination, reflexes, and judgement. Do not drive or operate heavy machinery.  Follow-up care  Follow up with your healthcare provider, or as advised.   A radiologist will review any X-rays that were taken. Your provide will notify you of any new findings that may affect your care.  Call 911  Call emergency services if any of the following occur:  · Trouble breathing  · Confusion  · Very drowsy or trouble awakening  · Fainting or loss of consciousness  · Rapid or very slow heart rate  · Loss of bowel or bladder control  When to seek medical advice  Call your healthcare provider right away if any of these occur:   · Pain becomes worse or spreads to your legs  · Weakness or numbness in one or both legs  · Numbness in the groin or genital area  Date Last Reviewed: 7/1/2016 © 2000-2017 [employer]. [address] [place] [address]. All rights reserved. This information is not intended as a substitute for professional medical care. Always follow your healthcare professional's instructions.    Follow up with your doctor in a few days as needed.  Return to the urgent care or go to the ER if symptoms get worse.    Bhupendra Frias MD

## 2019-07-05 ENCOUNTER — OFFICE VISIT (OUTPATIENT)
Dept: URGENT CARE | Facility: CLINIC | Age: 29
End: 2019-07-05
Payer: COMMERCIAL

## 2019-07-05 VITALS
RESPIRATION RATE: 16 BRPM | DIASTOLIC BLOOD PRESSURE: 63 MMHG | HEART RATE: 66 BPM | WEIGHT: 180 LBS | BODY MASS INDEX: 28.93 KG/M2 | TEMPERATURE: 97 F | OXYGEN SATURATION: 99 % | HEIGHT: 66 IN | SYSTOLIC BLOOD PRESSURE: 115 MMHG

## 2019-07-05 DIAGNOSIS — R51.9 NONINTRACTABLE HEADACHE, UNSPECIFIED CHRONICITY PATTERN, UNSPECIFIED HEADACHE TYPE: Primary | ICD-10-CM

## 2019-07-05 PROCEDURE — 96372 THER/PROPH/DIAG INJ SC/IM: CPT | Mod: S$GLB,,, | Performed by: FAMILY MEDICINE

## 2019-07-05 PROCEDURE — 99214 PR OFFICE/OUTPT VISIT, EST, LEVL IV, 30-39 MIN: ICD-10-PCS | Mod: 25,S$GLB,, | Performed by: FAMILY MEDICINE

## 2019-07-05 PROCEDURE — 96372 PR INJECTION,THERAP/PROPH/DIAG2ST, IM OR SUBCUT: ICD-10-PCS | Mod: S$GLB,,, | Performed by: FAMILY MEDICINE

## 2019-07-05 PROCEDURE — 3008F BODY MASS INDEX DOCD: CPT | Mod: CPTII,S$GLB,, | Performed by: FAMILY MEDICINE

## 2019-07-05 PROCEDURE — 3008F PR BODY MASS INDEX (BMI) DOCUMENTED: ICD-10-PCS | Mod: CPTII,S$GLB,, | Performed by: FAMILY MEDICINE

## 2019-07-05 PROCEDURE — 99214 OFFICE O/P EST MOD 30 MIN: CPT | Mod: 25,S$GLB,, | Performed by: FAMILY MEDICINE

## 2019-07-05 RX ORDER — BUTALBITAL, ACETAMINOPHEN AND CAFFEINE 50; 325; 40 MG/1; MG/1; MG/1
1 TABLET ORAL EVERY 6 HOURS PRN
Qty: 15 TABLET | Refills: 0 | Status: SHIPPED | OUTPATIENT
Start: 2019-07-05 | End: 2019-07-10

## 2019-07-05 RX ORDER — KETOROLAC TROMETHAMINE 30 MG/ML
60 INJECTION, SOLUTION INTRAMUSCULAR; INTRAVENOUS
Status: COMPLETED | OUTPATIENT
Start: 2019-07-05 | End: 2019-07-05

## 2019-07-05 RX ADMIN — KETOROLAC TROMETHAMINE 60 MG: 30 INJECTION, SOLUTION INTRAMUSCULAR; INTRAVENOUS at 03:07

## 2019-07-05 NOTE — PATIENT INSTRUCTIONS
"  Headache, Unspecified    A number of things can cause headaches. The cause of your headache isnt clear. But it doesnt seem to be a sign of any serious illness.  You could have a tension headache or a migraine headache.  Stress can cause a tension headache. This can happen if you tense the muscles of your shoulders, neck, and scalp without knowing it. If this stress lasts long enough, you may develop a tension headache.  It is not clear why migraines occur, but certain things called" triggers" can raise the risk of having a migraine attack. Migraine triggers may include emotional stress or depression, or by hormone changes during the menstrual cycle. Other triggers include birth control pills and other medicines, alcohol or caffeine, foods with tyramine (such as aged cheese, wine), eyestrain, weather changes, missed meals, and lack of sleep or oversleeping.  Other causes of headache include:  · Viral illness with high fever  · Head injury with concussion  · Sinus, ear, or throat infection  · Dental pain and jaw joint (TMJ) pain  More serious but less common causes of headache include stroke, brain hemorrhage, brain tumor, meningitis, and encephalitis.  Home care  Follow these tips when taking care of yourself at home:  · Dont drive yourself home if you were given pain medicine for your headache. Instead, have someone else drive you home. Try to sleep when you get home. You should feel much better when you wake up.  · Apply heat to the back of your neck to ease a neck muscle spasm. Take care of a migraine headache by putting an ice pack on your forehead or at the base of your skull.  · If you have nausea or vomiting, eat a light diet until your headache eases.  · If you have a migraine headache, use sunglasses when in the daylight or around bright indoor lighting until your symptoms get better. Bright glaring light can make this type of headache worse.  Follow-up care  Follow up with your healthcare provider, or " as advised. Talk with your provider if you have frequent headaches. He or she can help figure out a treatment plan. By knowing the earliest signs of headache, and starting treatment right away, you may be able to stop the pain yourself.  When to seek medical advice  Call your healthcare provider right away if any of these occur:  · Your head pain suddenly gets worse after sexual intercourse or strenuous activity  · Your head pain doesnt get better within 24 hours  · You arent able to keep liquids down (repeated vomiting)  · Fever of 100.4ºF (38ºC) or higher, or as directed by your healthcare provider  · Stiff neck  · Extreme drowsiness, confusion, or fainting  · Dizziness or dizziness with spinning sensation (vertigo)  · Weakness in an arm or leg or one side of your face  · You have trouble talking or seeing  Date Last Reviewed: 8/1/2016  © 9502-0465 [employer]. [address] [place] [address]. All rights reserved. This information is not intended as a substitute for professional medical care. Always follow your healthcare professional's instructions.      Follow up with your doctor in a few days as needed.  Return to the urgent care or go to the ER if symptoms get worse.    Bhupendra Frias MD

## 2019-07-05 NOTE — LETTER
July 5, 2019      [facility] [place]  [address]  [place] [address]  Phone: [phone]  Fax: [phone]       Patient: Yash Miguel   YOB: 1990  Date of Visit: 07/05/2019    To Whom It May Concern:    Aditya Miguel  was at [facility] on 07/05/2019. He may return to work/school on 7/8/2019 with no restrictions. If you have any questions or concerns, or if I can be of further assistance, please do not hesitate to contact me.    Sincerely,    Marlee Walker MA

## 2019-07-05 NOTE — PROGRESS NOTES
"Subjective:       Patient ID: Yash Miguel is a 29 y.o. male.    Vitals:  height is 5' 6" (1.676 m) and weight is 81.6 kg (180 lb). His temperature is 97 °F (36.1 °C). His blood pressure is 115/63 and his pulse is 66. His respiration is 16 and oxygen saturation is 99%.     Chief Complaint: Neck Pain    Left side head pain that goes from the top of head to his neck just below left year, began last night      Constitution: Negative for chills and fatigue.   HENT: Negative for congestion and sore throat.    Neck: Negative for painful lymph nodes.   Cardiovascular: Negative for leg swelling.   Eyes: Negative for double vision and blurred vision.   Respiratory: Negative for cough and shortness of breath.    Gastrointestinal: Negative for nausea, vomiting and diarrhea.   Genitourinary: Negative for dysuria, frequency and urgency.   Musculoskeletal: Negative for joint pain, joint swelling, muscle cramps and muscle ache.   Skin: Negative for color change, pale, rash and erythema.   Allergic/Immunologic: Negative for seasonal allergies.   Neurological: Negative for dizziness, history of vertigo, light-headedness and passing out.   Hematologic/Lymphatic: Negative for swollen lymph nodes, easy bruising/bleeding and history of blood clots. Does not bruise/bleed easily.   Psychiatric/Behavioral: Negative for nervous/anxious, sleep disturbance and depression. The patient is not nervous/anxious.        Objective:      Physical Exam   Constitutional: He is oriented to person, place, and time. He appears well-developed and well-nourished.   HENT:   Head: Normocephalic and atraumatic.   Right Ear: External ear normal.   Left Ear: External ear normal.   Mouth/Throat: Oropharynx is clear and moist.   Mild pain with pressure applied to the left forehead and left side of the neck muscle.   Eyes: Pupils are equal, round, and reactive to light. EOM are normal.   Neck: Normal range of motion. Neck supple.   Cardiovascular: Normal rate, " "regular rhythm and normal heart sounds. Exam reveals no gallop and no friction rub.   No murmur heard.  Pulmonary/Chest: Breath sounds normal. No respiratory distress. He has no wheezes. He has no rales.   Abdominal: Soft. Bowel sounds are normal. He exhibits no distension. There is no tenderness.   Musculoskeletal: Normal range of motion. He exhibits no edema, tenderness or deformity.   Lymphadenopathy:     He has no cervical adenopathy.   Neurological: He is alert and oriented to person, place, and time. No cranial nerve deficit or sensory deficit. He exhibits normal muscle tone. Coordination normal.   Skin: Skin is warm. Capillary refill takes less than 2 seconds. No rash noted. No erythema. No pallor.   Psychiatric: He has a normal mood and affect. His behavior is normal. Judgment and thought content normal.   Vitals reviewed.      Assessment:       1. Nonintractable headache, unspecified chronicity pattern, unspecified headache type        Plan:         Nonintractable headache, unspecified chronicity pattern, unspecified headache type  -     ketorolac injection 60 mg  -     butalbital-acetaminophen-caffeine -40 mg (FIORICET, ESGIC) -40 mg per tablet; Take 1 tablet by mouth every 6 (six) hours as needed for Pain (headache).  Dispense: 15 tablet; Refill: 0          Patient Instructions     Headache, Unspecified    A number of things can cause headaches. The cause of your headache isnt clear. But it doesnt seem to be a sign of any serious illness.  You could have a tension headache or a migraine headache.  Stress can cause a tension headache. This can happen if you tense the muscles of your shoulders, neck, and scalp without knowing it. If this stress lasts long enough, you may develop a tension headache.  It is not clear why migraines occur, but certain things called" triggers" can raise the risk of having a migraine attack. Migraine triggers may include emotional stress or depression, or by hormone " changes during the menstrual cycle. Other triggers include birth control pills and other medicines, alcohol or caffeine, foods with tyramine (such as aged cheese, wine), eyestrain, weather changes, missed meals, and lack of sleep or oversleeping.  Other causes of headache include:  · Viral illness with high fever  · Head injury with concussion  · Sinus, ear, or throat infection  · Dental pain and jaw joint (TMJ) pain  More serious but less common causes of headache include stroke, brain hemorrhage, brain tumor, meningitis, and encephalitis.  Home care  Follow these tips when taking care of yourself at home:  · Dont drive yourself home if you were given pain medicine for your headache. Instead, have someone else drive you home. Try to sleep when you get home. You should feel much better when you wake up.  · Apply heat to the back of your neck to ease a neck muscle spasm. Take care of a migraine headache by putting an ice pack on your forehead or at the base of your skull.  · If you have nausea or vomiting, eat a light diet until your headache eases.  · If you have a migraine headache, use sunglasses when in the daylight or around bright indoor lighting until your symptoms get better. Bright glaring light can make this type of headache worse.  Follow-up care  Follow up with your healthcare provider, or as advised. Talk with your provider if you have frequent headaches. He or she can help figure out a treatment plan. By knowing the earliest signs of headache, and starting treatment right away, you may be able to stop the pain yourself.  When to seek medical advice  Call your healthcare provider right away if any of these occur:  · Your head pain suddenly gets worse after sexual intercourse or strenuous activity  · Your head pain doesnt get better within 24 hours  · You arent able to keep liquids down (repeated vomiting)  · Fever of 100.4ºF (38ºC) or higher, or as directed by your healthcare provider  · Stiff  neck  · Extreme drowsiness, confusion, or fainting  · Dizziness or dizziness with spinning sensation (vertigo)  · Weakness in an arm or leg or one side of your face  · You have trouble talking or seeing  Date Last Reviewed: 8/1/2016  © 7464-2997 [employer]. [address] [place] [address]. All rights reserved. This information is not intended as a substitute for professional medical care. Always follow your healthcare professional's instructions.      Follow up with your doctor in a few days as needed.  Return to the urgent care or go to the ER if symptoms get worse.    Bhupendra Frias MD

## 2020-07-07 ENCOUNTER — OFFICE VISIT (OUTPATIENT)
Dept: URGENT CARE | Facility: CLINIC | Age: 30
End: 2020-07-07
Payer: COMMERCIAL

## 2020-07-07 VITALS
RESPIRATION RATE: 19 BRPM | SYSTOLIC BLOOD PRESSURE: 126 MMHG | HEART RATE: 85 BPM | DIASTOLIC BLOOD PRESSURE: 64 MMHG | BODY MASS INDEX: 30.53 KG/M2 | HEIGHT: 66 IN | OXYGEN SATURATION: 98 % | TEMPERATURE: 97 F | WEIGHT: 190 LBS

## 2020-07-07 DIAGNOSIS — M54.2 CERVICALGIA OF OCCIPITO-ATLANTO-AXIAL REGION: Primary | ICD-10-CM

## 2020-07-07 DIAGNOSIS — R51.9 ACUTE NONINTRACTABLE HEADACHE, UNSPECIFIED HEADACHE TYPE: ICD-10-CM

## 2020-07-07 DIAGNOSIS — M79.2 NEURALGIA: ICD-10-CM

## 2020-07-07 PROCEDURE — 96372 PR INJECTION,THERAP/PROPH/DIAG2ST, IM OR SUBCUT: ICD-10-PCS | Mod: S$GLB,,, | Performed by: INTERNAL MEDICINE

## 2020-07-07 PROCEDURE — 99214 PR OFFICE/OUTPT VISIT, EST, LEVL IV, 30-39 MIN: ICD-10-PCS | Mod: 25,S$GLB,, | Performed by: INTERNAL MEDICINE

## 2020-07-07 PROCEDURE — 96372 THER/PROPH/DIAG INJ SC/IM: CPT | Mod: S$GLB,,, | Performed by: INTERNAL MEDICINE

## 2020-07-07 PROCEDURE — 99214 OFFICE O/P EST MOD 30 MIN: CPT | Mod: 25,S$GLB,, | Performed by: INTERNAL MEDICINE

## 2020-07-07 RX ORDER — KETOROLAC TROMETHAMINE 30 MG/ML
30 INJECTION, SOLUTION INTRAMUSCULAR; INTRAVENOUS
Status: COMPLETED | OUTPATIENT
Start: 2020-07-07 | End: 2020-07-07

## 2020-07-07 RX ORDER — BUTALBITAL, ACETAMINOPHEN AND CAFFEINE 50; 325; 40 MG/1; MG/1; MG/1
1 TABLET ORAL EVERY 6 HOURS PRN
Qty: 30 TABLET | Refills: 0 | Status: SHIPPED | OUTPATIENT
Start: 2020-07-07 | End: 2020-08-06

## 2020-07-07 RX ORDER — NAPROXEN 500 MG/1
500 TABLET ORAL 2 TIMES DAILY WITH MEALS
Qty: 20 TABLET | Refills: 0 | OUTPATIENT
Start: 2020-07-08 | End: 2020-07-14

## 2020-07-07 RX ADMIN — KETOROLAC TROMETHAMINE 30 MG: 30 INJECTION, SOLUTION INTRAMUSCULAR; INTRAVENOUS at 10:07

## 2020-07-07 NOTE — LETTER
July 7, 2020      [facility]  [address] [place] [address]  Phone: [phone]  Fax: [phone]       Patient: Yash Miguel   YOB: 1990  Date of Visit: 07/07/2020    To Whom It May Concern:    Aditya Miguel  was at [facility] on 07/07/2020. He may return to work/school on 7/9/2020 with no restrictions. If you have any questions or concerns, or if I can be of further assistance, please do not hesitate to contact me.    Sincerely,    Jerman Moise MD

## 2020-07-07 NOTE — PROGRESS NOTES
"Subjective:       Patient ID: Yash Miguel is a 30 y.o. male.    Vitals:  height is 5' 6" (1.676 m) and weight is 86.2 kg (190 lb). His tympanic temperature is 97.4 °F (36.3 °C). His blood pressure is 126/64 and his pulse is 85. His respiration is 19 and oxygen saturation is 98%.     Chief Complaint: Facial Pain    Facial Pain  This is a new problem. The current episode started in the past 7 days (x2 days). The problem occurs rarely. The problem has been gradually worsening. Pertinent negatives include no abdominal pain, fatigue, joint swelling or vertigo. Nothing aggravates the symptoms. He has tried acetaminophen for the symptoms. The treatment provided no relief.       Constitution: Negative for fatigue.   HENT: Negative for facial swelling and facial trauma.    Neck: Negative for neck stiffness.   Cardiovascular: Negative for chest trauma.   Eyes: Negative for eye trauma, double vision and blurred vision.   Gastrointestinal: Negative for abdominal trauma, abdominal pain and rectal bleeding.   Genitourinary: Negative for hematuria, genital trauma and pelvic pain.   Musculoskeletal: Negative for pain, trauma, joint swelling, abnormal ROM of joint and pain with walking.   Skin: Negative for color change, wound, abrasion and laceration.   Neurological: Negative for dizziness, history of vertigo, light-headedness, coordination disturbances, altered mental status and loss of consciousness.   Hematologic/Lymphatic: Negative for history of bleeding disorder.   Psychiatric/Behavioral: Negative for altered mental status.       Objective:      Physical Exam   HENT:   Head: Normocephalic and atraumatic.   Nose: Nose normal.   Mouth/Throat: Mucous membranes are moist. Oropharynx is clear.   Eyes: Pupils are equal, round, and reactive to light. Conjunctivae are normal.   Neck:   Tenderness L post neck/occiput  That causes pain into L scalp   Abdominal: Normal appearance.   Neurological: He is alert.   Nursing note and " vitals reviewed.        Assessment:       1. Cervicalgia of lgbthvmj-ynyygzo-ypmaf region    2. Neuralgia    3. Acute nonintractable headache, unspecified headache type        Plan:         Cervicalgia of [id number]-[id number]-[id number] region    Neuralgia    Acute nonintractable headache, unspecified headache type    Other orders  -     ketorolac injection 30 mg  -     naproxen (NAPROSYN) 500 MG tablet; Take 1 tablet (500 mg total) by mouth 2 (two) times daily with meals. for 10 days  Dispense: 20 tablet; Refill: 0  -     butalbital-acetaminophen-caffeine -40 mg (FIORICET, ESGIC) -40 mg per tablet; Take 1 tablet by mouth every 6 (six) hours as needed for Headaches.  Dispense: 30 tablet; Refill: 0

## 2020-10-31 ENCOUNTER — OFFICE VISIT (OUTPATIENT)
Dept: URGENT CARE | Facility: CLINIC | Age: 30
End: 2020-10-31
Payer: COMMERCIAL

## 2020-10-31 VITALS
SYSTOLIC BLOOD PRESSURE: 134 MMHG | TEMPERATURE: 99 F | BODY MASS INDEX: 28.12 KG/M2 | DIASTOLIC BLOOD PRESSURE: 79 MMHG | WEIGHT: 175 LBS | OXYGEN SATURATION: 100 % | HEART RATE: 80 BPM | HEIGHT: 66 IN | RESPIRATION RATE: 20 BRPM

## 2020-10-31 DIAGNOSIS — S46.819A STRAIN OF TRAPEZIUS MUSCLE, UNSPECIFIED LATERALITY, INITIAL ENCOUNTER: ICD-10-CM

## 2020-10-31 DIAGNOSIS — R51.9 NONINTRACTABLE HEADACHE, UNSPECIFIED CHRONICITY PATTERN, UNSPECIFIED HEADACHE TYPE: Primary | ICD-10-CM

## 2020-10-31 LAB
CTP QC/QA: YES
SARS-COV-2 RDRP RESP QL NAA+PROBE: NEGATIVE

## 2020-10-31 PROCEDURE — 96372 THER/PROPH/DIAG INJ SC/IM: CPT | Mod: S$GLB,,, | Performed by: FAMILY MEDICINE

## 2020-10-31 PROCEDURE — 99214 OFFICE O/P EST MOD 30 MIN: CPT | Mod: 25,S$GLB,, | Performed by: FAMILY MEDICINE

## 2020-10-31 PROCEDURE — U0002 COVID-19 LAB TEST NON-CDC: HCPCS | Mod: QW,S$GLB,, | Performed by: FAMILY MEDICINE

## 2020-10-31 PROCEDURE — 96372 PR INJECTION,THERAP/PROPH/DIAG2ST, IM OR SUBCUT: ICD-10-PCS | Mod: S$GLB,,, | Performed by: FAMILY MEDICINE

## 2020-10-31 PROCEDURE — 99214 PR OFFICE/OUTPT VISIT, EST, LEVL IV, 30-39 MIN: ICD-10-PCS | Mod: 25,S$GLB,, | Performed by: FAMILY MEDICINE

## 2020-10-31 PROCEDURE — U0002: ICD-10-PCS | Mod: QW,S$GLB,, | Performed by: FAMILY MEDICINE

## 2020-10-31 RX ORDER — KETOROLAC TROMETHAMINE 30 MG/ML
30 INJECTION, SOLUTION INTRAMUSCULAR; INTRAVENOUS
Status: COMPLETED | OUTPATIENT
Start: 2020-10-31 | End: 2020-10-31

## 2020-10-31 RX ORDER — MELOXICAM 15 MG/1
15 TABLET ORAL DAILY
Qty: 30 TABLET | Refills: 0 | Status: SHIPPED | OUTPATIENT
Start: 2020-10-31 | End: 2021-09-08 | Stop reason: ALTCHOICE

## 2020-10-31 RX ORDER — CYCLOBENZAPRINE HCL 10 MG
10 TABLET ORAL 3 TIMES DAILY PRN
Qty: 21 TABLET | Refills: 0 | Status: SHIPPED | OUTPATIENT
Start: 2020-10-31 | End: 2020-11-10

## 2020-10-31 RX ADMIN — KETOROLAC TROMETHAMINE 30 MG: 30 INJECTION, SOLUTION INTRAMUSCULAR; INTRAVENOUS at 11:10

## 2020-10-31 NOTE — LETTER
October 31, 2020      [facility]  [place]  [address] [place] [address]  Phone: [phone]  Fax: [phone]       Patient: Yash Miguel   YOB: 1990  Date of Visit: 10/31/2020    To Whom It May Concern:    Aditya Miguel  was at [facility] on 10/31/2020. He may return to work/school on 11/01/2020 with no restrictions. If you have any questions or concerns, or if I can be of further assistance, please do not hesitate to contact me.    Sincerely,    Tamera Martinez

## 2020-10-31 NOTE — PATIENT INSTRUCTIONS
Tension Headache    A muscle tension headache is a very common cause of head pain. Its also called a stress headache. When some people are under stress, they tense the muscles of their shoulder, neck, and scalp without knowing it. If this tension lasts long enough, a headache can occur. A tension headache can be quite painful. It can last for hours or even days.  Home care  Follow these tips when caring for yourself at home:  · Dont drive yourself home if you were given pain medicine for your headache. Instead, have someone else drive you home. Try to sleep when you get home. You should feel much better when you wake up.  · Put heat on the back of your neck to help ease neck spasm.  · Drink only clear liquids or eat a light diet until your symptoms get better. This will help you avoid nausea or vomiting.  How to prevent headaches  · Figure out what is causing stress in your life. Learn new ways to handle your stress. Ideas include regular exercise, biofeedback, self-hypnosis, yoga, and meditation. Talk with your healthcare provider to find out more information about managing stress. Many books and digital media are also available on this subject.  · Take time out at the first sign of a tension headache, if possible. Take yourself out of the stressful situation. Find a quiet, comfortable place to sit or lie down and let yourself relax. Heat and deep massage of the tight areas in the neck and shoulders may help ease muscle spasm. You may also get relief from a medicine like ibuprofen or a prescribed muscle relaxant.  Follow-up care  Follow up with your healthcare provider, or as advised. Talk with your provider if you have frequent headaches. He or she can figure out a treatment plan. Ask if you can have medicine to take at home the next time you get a bad headache. This may keep you from having to visit the emergency department in the future. You may need to see a headache specialist (neurologist) if you continue  to have headaches.  When to seek medical advice  Call your healthcare provider right away if any of these occur:  · Your head pain gets worse during sexual intercourse or strenuous activity  · Your head pain doesnt get better within 24 hours  · You arent able to keep liquids down (repeated vomiting)  · Fever of 100.4ºF (38ºC) or higher, or as directed by your healthcare provider  · Stiff neck  · Extreme drowsiness, confusion, or fainting  · Dizziness or dizziness with spinning sensation (vertigo)  · Weakness in an arm or leg or one side of your face  · You have difficulty speaking  · Your vision changes  Date Last Reviewed: 8/1/2016  © 0974-0448 [employer]. [address] [place] [address], [place] [address]. All rights reserved. This information is not intended as a substitute for professional medical care. Always follow your healthcare professional's instructions.

## 2020-10-31 NOTE — PROGRESS NOTES
"Subjective:       Patient ID: Yash Miguel is a 30 y.o. male.    Vitals:  height is 5' 6" (1.676 m) and weight is 79.4 kg (175 lb). His temperature is 98.8 °F (37.1 °C). His blood pressure is 134/79 and his pulse is 80. His respiration is 20 and oxygen saturation is 100%.     Chief Complaint: Headache    Pt states his headache started two days ago and tried taking otc meds with no relief.     Headache   This is a new problem. The current episode started in the past 7 days. The problem occurs constantly. The problem has been unchanged. The pain is located in the bilateral region. The pain does not radiate. The pain quality is similar to prior headaches. The quality of the pain is described as aching. The pain is at a severity of 8/10. Pertinent negatives include no blurred vision, dizziness, eye pain, fever, loss of balance, nausea, neck pain, photophobia, tinnitus or vomiting. Nothing aggravates the symptoms. He has tried NSAIDs (nyquil) for the symptoms. The treatment provided no relief. There is no history of migraine headaches.       Constitution: Negative for chills, sweating and fever.   HENT: Negative for tinnitus, facial swelling, congestion and sinus pain.    Neck: Negative for neck pain and neck stiffness.   Eyes: Negative for eye pain, photophobia, vision loss, double vision and blurred vision.   Gastrointestinal: Negative for nausea and vomiting.   Musculoskeletal: Negative for trauma and muscle ache.   Skin: Negative for rash, wound and lesion.   Neurological: Positive for headaches. Negative for dizziness, history of vertigo, light-headedness, facial drooping, speech difficulty, coordination disturbances, loss of balance, history of migraines, disorientation and loss of consciousness.   Psychiatric/Behavioral: Negative for disorientation, confusion, nervous/anxious, sleep disturbance and depression. The patient is not nervous/anxious.        Objective:      Physical Exam   HENT:   Head: Normocephalic. "   Neck: Normal range of motion. Neck supple. Muscular tenderness (trapezius bilateral left > right) present.   Cardiovascular: Normal rate, regular rhythm, normal heart sounds and normal pulses.   Pulmonary/Chest: Effort normal and breath sounds normal.   Abdominal: Normal appearance.   Neurological: He is alert.   Nursing note and vitals reviewed.        Assessment:       1. Nonintractable headache, unspecified chronicity pattern, unspecified headache type    2. Strain of trapezius muscle, unspecified laterality, initial encounter        Plan:         Nonintractable headache, unspecified chronicity pattern, unspecified headache type  -     Cancel: POCT COVID-19 Rapid Screening  -     POCT COVID-19 Rapid Screening  -     ketorolac injection 30 mg  -     Ambulatory referral/consult to Neurology    Strain of trapezius muscle, unspecified laterality, initial encounter  -     meloxicam (MOBIC) 15 MG tablet; Take 1 tablet (15 mg total) by mouth once daily.  Dispense: 30 tablet; Refill: 0  -     Ambulatory referral/consult to Neurology    Other orders  -     cyclobenzaprine (FLEXERIL) 10 MG tablet; Take 1 tablet (10 mg total) by mouth 3 (three) times daily as needed.  Dispense: 21 tablet; Refill: 0

## 2021-07-16 ENCOUNTER — OFFICE VISIT (OUTPATIENT)
Dept: URGENT CARE | Facility: CLINIC | Age: 31
End: 2021-07-16
Payer: COMMERCIAL

## 2021-07-16 VITALS
TEMPERATURE: 98 F | OXYGEN SATURATION: 99 % | SYSTOLIC BLOOD PRESSURE: 126 MMHG | HEART RATE: 80 BPM | DIASTOLIC BLOOD PRESSURE: 72 MMHG

## 2021-07-16 DIAGNOSIS — Z20.822 CONTACT WITH AND (SUSPECTED) EXPOSURE TO COVID-19: Primary | ICD-10-CM

## 2021-07-16 LAB
CTP QC/QA: YES
SARS-COV-2 RDRP RESP QL NAA+PROBE: NEGATIVE

## 2021-07-16 PROCEDURE — U0002: ICD-10-PCS | Mod: QW,S$GLB,, | Performed by: PHYSICIAN ASSISTANT

## 2021-07-16 PROCEDURE — 99212 PR OFFICE/OUTPT VISIT, EST, LEVL II, 10-19 MIN: ICD-10-PCS | Mod: S$GLB,,, | Performed by: PHYSICIAN ASSISTANT

## 2021-07-16 PROCEDURE — 99212 OFFICE O/P EST SF 10 MIN: CPT | Mod: S$GLB,,, | Performed by: PHYSICIAN ASSISTANT

## 2021-07-16 PROCEDURE — U0002 COVID-19 LAB TEST NON-CDC: HCPCS | Mod: QW,S$GLB,, | Performed by: PHYSICIAN ASSISTANT

## 2021-08-26 ENCOUNTER — OFFICE VISIT (OUTPATIENT)
Dept: URGENT CARE | Facility: CLINIC | Age: 31
End: 2021-08-26
Payer: COMMERCIAL

## 2021-08-26 VITALS
HEART RATE: 77 BPM | OXYGEN SATURATION: 100 % | HEIGHT: 66 IN | TEMPERATURE: 98 F | SYSTOLIC BLOOD PRESSURE: 138 MMHG | BODY MASS INDEX: 30.53 KG/M2 | DIASTOLIC BLOOD PRESSURE: 70 MMHG | RESPIRATION RATE: 16 BRPM | WEIGHT: 190 LBS

## 2021-08-26 DIAGNOSIS — R51.9 NONINTRACTABLE HEADACHE, UNSPECIFIED CHRONICITY PATTERN, UNSPECIFIED HEADACHE TYPE: ICD-10-CM

## 2021-08-26 DIAGNOSIS — H61.23 BILATERAL IMPACTED CERUMEN: Primary | ICD-10-CM

## 2021-08-26 DIAGNOSIS — Z11.52 ENCOUNTER FOR SCREENING FOR COVID-19: ICD-10-CM

## 2021-08-26 LAB
CTP QC/QA: YES
SARS-COV-2 RDRP RESP QL NAA+PROBE: NEGATIVE

## 2021-08-26 PROCEDURE — 1159F PR MEDICATION LIST DOCUMENTED IN MEDICAL RECORD: ICD-10-PCS | Mod: CPTII,S$GLB,, | Performed by: NURSE PRACTITIONER

## 2021-08-26 PROCEDURE — U0002 COVID-19 LAB TEST NON-CDC: HCPCS | Mod: QW,S$GLB,, | Performed by: NURSE PRACTITIONER

## 2021-08-26 PROCEDURE — 69209 REMOVE IMPACTED EAR WAX UNI: CPT | Mod: 50,S$GLB,, | Performed by: NURSE PRACTITIONER

## 2021-08-26 PROCEDURE — 1160F RVW MEDS BY RX/DR IN RCRD: CPT | Mod: CPTII,S$GLB,, | Performed by: NURSE PRACTITIONER

## 2021-08-26 PROCEDURE — 69209 EAR CERUMEN REMOVAL: ICD-10-PCS | Mod: 50,S$GLB,, | Performed by: NURSE PRACTITIONER

## 2021-08-26 PROCEDURE — 3078F PR MOST RECENT DIASTOLIC BLOOD PRESSURE < 80 MM HG: ICD-10-PCS | Mod: CPTII,S$GLB,, | Performed by: NURSE PRACTITIONER

## 2021-08-26 PROCEDURE — 3008F BODY MASS INDEX DOCD: CPT | Mod: CPTII,S$GLB,, | Performed by: NURSE PRACTITIONER

## 2021-08-26 PROCEDURE — 99213 OFFICE O/P EST LOW 20 MIN: CPT | Mod: 25,S$GLB,, | Performed by: NURSE PRACTITIONER

## 2021-08-26 PROCEDURE — 1159F MED LIST DOCD IN RCRD: CPT | Mod: CPTII,S$GLB,, | Performed by: NURSE PRACTITIONER

## 2021-08-26 PROCEDURE — 3008F PR BODY MASS INDEX (BMI) DOCUMENTED: ICD-10-PCS | Mod: CPTII,S$GLB,, | Performed by: NURSE PRACTITIONER

## 2021-08-26 PROCEDURE — 3078F DIAST BP <80 MM HG: CPT | Mod: CPTII,S$GLB,, | Performed by: NURSE PRACTITIONER

## 2021-08-26 PROCEDURE — U0002: ICD-10-PCS | Mod: QW,S$GLB,, | Performed by: NURSE PRACTITIONER

## 2021-08-26 PROCEDURE — 3075F SYST BP GE 130 - 139MM HG: CPT | Mod: CPTII,S$GLB,, | Performed by: NURSE PRACTITIONER

## 2021-08-26 PROCEDURE — 3075F PR MOST RECENT SYSTOLIC BLOOD PRESS GE 130-139MM HG: ICD-10-PCS | Mod: CPTII,S$GLB,, | Performed by: NURSE PRACTITIONER

## 2021-08-26 PROCEDURE — 99213 PR OFFICE/OUTPT VISIT, EST, LEVL III, 20-29 MIN: ICD-10-PCS | Mod: 25,S$GLB,, | Performed by: NURSE PRACTITIONER

## 2021-08-26 PROCEDURE — 1160F PR REVIEW ALL MEDS BY PRESCRIBER/CLIN PHARMACIST DOCUMENTED: ICD-10-PCS | Mod: CPTII,S$GLB,, | Performed by: NURSE PRACTITIONER

## 2021-08-26 RX ORDER — IBUPROFEN 800 MG/1
800 TABLET ORAL 3 TIMES DAILY
Qty: 30 TABLET | Refills: 0 | Status: SHIPPED | OUTPATIENT
Start: 2021-08-26 | End: 2021-09-08 | Stop reason: ALTCHOICE

## 2022-01-11 DIAGNOSIS — U07.1 COVID-19 VIRUS DETECTED: ICD-10-CM

## 2022-11-29 ENCOUNTER — OFFICE VISIT (OUTPATIENT)
Dept: URGENT CARE | Facility: CLINIC | Age: 32
End: 2022-11-29
Payer: COMMERCIAL

## 2022-11-29 VITALS
RESPIRATION RATE: 16 BRPM | HEIGHT: 67 IN | HEART RATE: 84 BPM | OXYGEN SATURATION: 98 % | SYSTOLIC BLOOD PRESSURE: 121 MMHG | WEIGHT: 198 LBS | DIASTOLIC BLOOD PRESSURE: 63 MMHG | TEMPERATURE: 99 F | BODY MASS INDEX: 31.08 KG/M2

## 2022-11-29 DIAGNOSIS — Z72.51 RISK FOR SEXUALLY TRANSMITTED DISEASE: ICD-10-CM

## 2022-11-29 DIAGNOSIS — R30.0 DYSURIA: Primary | ICD-10-CM

## 2022-11-29 LAB
BILIRUB UR QL STRIP: NEGATIVE
GLUCOSE UR QL STRIP: NEGATIVE
KETONES UR QL STRIP: NEGATIVE
LEUKOCYTE ESTERASE UR QL STRIP: NEGATIVE
PH, POC UA: 5.5 (ref 5–8)
POC BLOOD, URINE: NEGATIVE
POC NITRATES, URINE: NEGATIVE
PROT UR QL STRIP: POSITIVE
SP GR UR STRIP: 1.02 (ref 1–1.03)
UROBILINOGEN UR STRIP-ACNC: ABNORMAL (ref 0.3–2.2)

## 2022-11-29 PROCEDURE — 3074F PR MOST RECENT SYSTOLIC BLOOD PRESSURE < 130 MM HG: ICD-10-PCS | Mod: CPTII,S$GLB,, | Performed by: NURSE PRACTITIONER

## 2022-11-29 PROCEDURE — 1160F RVW MEDS BY RX/DR IN RCRD: CPT | Mod: CPTII,S$GLB,, | Performed by: NURSE PRACTITIONER

## 2022-11-29 PROCEDURE — 87491 CHLMYD TRACH DNA AMP PROBE: CPT | Performed by: NURSE PRACTITIONER

## 2022-11-29 PROCEDURE — 3078F DIAST BP <80 MM HG: CPT | Mod: CPTII,S$GLB,, | Performed by: NURSE PRACTITIONER

## 2022-11-29 PROCEDURE — 3074F SYST BP LT 130 MM HG: CPT | Mod: CPTII,S$GLB,, | Performed by: NURSE PRACTITIONER

## 2022-11-29 PROCEDURE — 99213 OFFICE O/P EST LOW 20 MIN: CPT | Mod: S$GLB,,, | Performed by: NURSE PRACTITIONER

## 2022-11-29 PROCEDURE — 3008F BODY MASS INDEX DOCD: CPT | Mod: CPTII,S$GLB,, | Performed by: NURSE PRACTITIONER

## 2022-11-29 PROCEDURE — 87591 N.GONORRHOEAE DNA AMP PROB: CPT | Performed by: NURSE PRACTITIONER

## 2022-11-29 PROCEDURE — 1160F PR REVIEW ALL MEDS BY PRESCRIBER/CLIN PHARMACIST DOCUMENTED: ICD-10-PCS | Mod: CPTII,S$GLB,, | Performed by: NURSE PRACTITIONER

## 2022-11-29 PROCEDURE — 1159F PR MEDICATION LIST DOCUMENTED IN MEDICAL RECORD: ICD-10-PCS | Mod: CPTII,S$GLB,, | Performed by: NURSE PRACTITIONER

## 2022-11-29 PROCEDURE — 81003 URINALYSIS AUTO W/O SCOPE: CPT | Mod: QW,S$GLB,, | Performed by: NURSE PRACTITIONER

## 2022-11-29 PROCEDURE — 3078F PR MOST RECENT DIASTOLIC BLOOD PRESSURE < 80 MM HG: ICD-10-PCS | Mod: CPTII,S$GLB,, | Performed by: NURSE PRACTITIONER

## 2022-11-29 PROCEDURE — 99213 PR OFFICE/OUTPT VISIT, EST, LEVL III, 20-29 MIN: ICD-10-PCS | Mod: S$GLB,,, | Performed by: NURSE PRACTITIONER

## 2022-11-29 PROCEDURE — 3008F PR BODY MASS INDEX (BMI) DOCUMENTED: ICD-10-PCS | Mod: CPTII,S$GLB,, | Performed by: NURSE PRACTITIONER

## 2022-11-29 PROCEDURE — 87661 TRICHOMONAS VAGINALIS AMPLIF: CPT | Performed by: NURSE PRACTITIONER

## 2022-11-29 PROCEDURE — 81003 POCT URINALYSIS, DIPSTICK, AUTOMATED, W/O SCOPE: ICD-10-PCS | Mod: QW,S$GLB,, | Performed by: NURSE PRACTITIONER

## 2022-11-29 PROCEDURE — 1159F MED LIST DOCD IN RCRD: CPT | Mod: CPTII,S$GLB,, | Performed by: NURSE PRACTITIONER

## 2022-11-30 NOTE — PATIENT INSTRUCTIONS
Avoid sex until you know your test results are negative; or for 2 weeks while being treated, if your test is positive.     In future, practice safe sex with condoms.

## 2022-11-30 NOTE — PROGRESS NOTES
"Subjective:       Patient ID: Yash Miguel is a 32 y.o. male.    Vitals:  height is 5' 7" (1.702 m) and weight is 89.8 kg (198 lb). His temperature is 98.7 °F (37.1 °C). His blood pressure is 121/63 and his pulse is 84. His respiration is 16 and oxygen saturation is 98%.     Chief Complaint: Exposure to STD    32 year old male presents to  today with c/o dysuria, malodorous urine, penile discharge, and penile itching x2 weeks. Pt reports that initially he had a rash around his groin area-the rash was non painful and appeared to look like blackheads. He reports that rash and itching have completely resolved. Pt reports a woman that he had sex with informed him that she had an STI. When she informed him, the conversation was quick, and he does not believe she told him exactly what she had. Denies fever. Denies abdominal pain and back pain.     Exposure to STD  The patient's primary symptoms include genital itching and penile discharge. The patient's pertinent negatives include no penile pain, scrotal swelling or testicular pain. This is a new problem. The current episode started 1 to 4 weeks ago. The problem occurs constantly. The problem has been gradually worsening. The patient is experiencing no pain. Pertinent negatives include no abdominal pain, flank pain, painful intercourse or urgency. The penile discharge was Yellow. The genital lesion's characteristics include Rash. The symptoms are aggravated by urination. He has tried nothing for the symptoms. The treatment provided no relief. He is sexually active. He inconsistently uses condoms. It is unknown whether or not his partner has an STD. There is no history of BPH, chlamydia, gonorrhea, herpes simplex, HIV or syphilis.     Gastrointestinal:  Negative for abdominal pain.   Genitourinary:  Positive for penile discharge. Negative for urgency, flank pain, penile pain, scrotal swelling and testicular pain.     Objective:      Physical Exam   Constitutional: He " is oriented to person, place, and time.  Non-toxic appearance. He does not appear ill. No distress.   HENT:   Head: Normocephalic.   Nose: Nose normal.   Mouth/Throat: Mucous membranes are moist.   Eyes: Right eye exhibits no discharge. Left eye exhibits no discharge.   Neck: No neck rigidity present.   Cardiovascular: Normal rate.   Pulmonary/Chest: Effort normal.   Abdominal: Normal appearance. He exhibits no distension. flat abdomen   Musculoskeletal: Normal range of motion.         General: Normal range of motion.   Neurological: He is alert and oriented to person, place, and time.   Skin: Skin is warm, dry and not diaphoretic.   Psychiatric: His behavior is normal. Mood normal.   Nursing note and vitals reviewed.      Results for orders placed or performed in visit on 11/29/22   POCT Urinalysis, Dipstick, Automated, W/O Scope   Result Value Ref Range    POC Blood, Urine Negative Negative    POC Bilirubin, Urine Negative Negative    POC Urobilinogen, Urine Norm 0.3 - 2.2    POC Ketones, Urine Negative Negative    POC Protein, Urine Positive (A) Negative    POC Nitrates, Urine Negative Negative    POC Glucose, Urine Negative Negative    pH, UA 5.5 5 - 8    POC Specific Gravity, Urine 1.025 1.003 - 1.029    POC Leukocytes, Urine Negative Negative      Assessment:       1. Dysuria    2. Risk for sexually transmitted disease          Plan:       Pt declines treatment at this time. He would like a diagnosis first-from the testing, before he might receive an unnecessary injection (he wants to avoid a shot if possible).     Dysuria  -     POCT Urinalysis, Dipstick, Automated, W/O Scope  -     Trichomonas vaginalis, RNA, Qual, Urine  -     C. trachomatis/N. gonorrhoeae by AMP DNA [facility]; Urine    Risk for sexually transmitted disease  -     POCT Urinalysis, Dipstick, Automated, W/O Scope  -     Trichomonas vaginalis, RNA, Qual, Urine  -     C. trachomatis/N. gonorrhoeae by AMP DNA [facility]; Urine       Patient  Instructions   Avoid sex until you know your test results are negative; or for 2 weeks while being treated, if your test is positive.     In future, practice safe sex with condoms.

## 2022-12-01 LAB
C TRACH DNA SPEC QL NAA+PROBE: NOT DETECTED
N GONORRHOEA DNA SPEC QL NAA+PROBE: NOT DETECTED

## 2022-12-05 LAB
T VAGINALIS RRNA SPEC QL NAA+PROBE: NOT DETECTED
TRICHOMONAS VAGINALIS RNA, QUAL, SOURCE: NORMAL

## 2022-12-07 ENCOUNTER — TELEPHONE (OUTPATIENT)
Dept: URGENT CARE | Facility: CLINIC | Age: 32
End: 2022-12-07
Payer: COMMERCIAL

## 2023-01-18 ENCOUNTER — OFFICE VISIT (OUTPATIENT)
Dept: URGENT CARE | Facility: CLINIC | Age: 33
End: 2023-01-18
Payer: COMMERCIAL

## 2023-01-18 VITALS
SYSTOLIC BLOOD PRESSURE: 109 MMHG | WEIGHT: 198 LBS | OXYGEN SATURATION: 98 % | TEMPERATURE: 98 F | DIASTOLIC BLOOD PRESSURE: 71 MMHG | RESPIRATION RATE: 14 BRPM | BODY MASS INDEX: 31.08 KG/M2 | HEIGHT: 67 IN | HEART RATE: 84 BPM

## 2023-01-18 DIAGNOSIS — G44.86 CERVICOGENIC HEADACHE: Primary | ICD-10-CM

## 2023-01-18 DIAGNOSIS — M79.18 MUSCULOSKELETAL PAIN: ICD-10-CM

## 2023-01-18 DIAGNOSIS — H61.23 BILATERAL IMPACTED CERUMEN: ICD-10-CM

## 2023-01-18 PROCEDURE — 3074F PR MOST RECENT SYSTOLIC BLOOD PRESSURE < 130 MM HG: ICD-10-PCS | Mod: CPTII,S$GLB,, | Performed by: FAMILY MEDICINE

## 2023-01-18 PROCEDURE — 99213 OFFICE O/P EST LOW 20 MIN: CPT | Mod: 25,S$GLB,, | Performed by: FAMILY MEDICINE

## 2023-01-18 PROCEDURE — 99213 PR OFFICE/OUTPT VISIT, EST, LEVL III, 20-29 MIN: ICD-10-PCS | Mod: 25,S$GLB,, | Performed by: FAMILY MEDICINE

## 2023-01-18 PROCEDURE — 3008F PR BODY MASS INDEX (BMI) DOCUMENTED: ICD-10-PCS | Mod: CPTII,S$GLB,, | Performed by: FAMILY MEDICINE

## 2023-01-18 PROCEDURE — 3008F BODY MASS INDEX DOCD: CPT | Mod: CPTII,S$GLB,, | Performed by: FAMILY MEDICINE

## 2023-01-18 PROCEDURE — 3078F PR MOST RECENT DIASTOLIC BLOOD PRESSURE < 80 MM HG: ICD-10-PCS | Mod: CPTII,S$GLB,, | Performed by: FAMILY MEDICINE

## 2023-01-18 PROCEDURE — 1159F PR MEDICATION LIST DOCUMENTED IN MEDICAL RECORD: ICD-10-PCS | Mod: CPTII,S$GLB,, | Performed by: FAMILY MEDICINE

## 2023-01-18 PROCEDURE — 1159F MED LIST DOCD IN RCRD: CPT | Mod: CPTII,S$GLB,, | Performed by: FAMILY MEDICINE

## 2023-01-18 PROCEDURE — 96372 THER/PROPH/DIAG INJ SC/IM: CPT | Mod: S$GLB,,, | Performed by: FAMILY MEDICINE

## 2023-01-18 PROCEDURE — 1160F PR REVIEW ALL MEDS BY PRESCRIBER/CLIN PHARMACIST DOCUMENTED: ICD-10-PCS | Mod: CPTII,S$GLB,, | Performed by: FAMILY MEDICINE

## 2023-01-18 PROCEDURE — 3078F DIAST BP <80 MM HG: CPT | Mod: CPTII,S$GLB,, | Performed by: FAMILY MEDICINE

## 2023-01-18 PROCEDURE — 96372 PR INJECTION,THERAP/PROPH/DIAG2ST, IM OR SUBCUT: ICD-10-PCS | Mod: S$GLB,,, | Performed by: FAMILY MEDICINE

## 2023-01-18 PROCEDURE — 1160F RVW MEDS BY RX/DR IN RCRD: CPT | Mod: CPTII,S$GLB,, | Performed by: FAMILY MEDICINE

## 2023-01-18 PROCEDURE — 3074F SYST BP LT 130 MM HG: CPT | Mod: CPTII,S$GLB,, | Performed by: FAMILY MEDICINE

## 2023-01-18 RX ORDER — KETOROLAC TROMETHAMINE 30 MG/ML
30 INJECTION, SOLUTION INTRAMUSCULAR; INTRAVENOUS
Status: COMPLETED | OUTPATIENT
Start: 2023-01-18 | End: 2023-01-18

## 2023-01-18 RX ORDER — BUTALBITAL, ACETAMINOPHEN AND CAFFEINE 50; 325; 40 MG/1; MG/1; MG/1
1 TABLET ORAL 3 TIMES DAILY PRN
Qty: 30 TABLET | Refills: 0 | Status: SHIPPED | OUTPATIENT
Start: 2023-01-18 | End: 2023-04-17

## 2023-01-18 RX ORDER — METHOCARBAMOL 500 MG/1
500 TABLET, FILM COATED ORAL NIGHTLY PRN
Qty: 10 TABLET | Refills: 0 | Status: SHIPPED | OUTPATIENT
Start: 2023-01-18 | End: 2023-01-28

## 2023-01-18 RX ADMIN — KETOROLAC TROMETHAMINE 30 MG: 30 INJECTION, SOLUTION INTRAMUSCULAR; INTRAVENOUS at 10:01

## 2023-01-18 NOTE — LETTER
January 18, 2023      [facility]  [address]  [place] [address]  Phone: [phone]  Fax: [phone]       Patient: Yash Miguel   YOB: 1990  Date of Visit: 01/18/2023    To Whom It May Concern:    Aditya Miguel  was at [facility] on 01/18/2023. He has an acute medical condition which requires absence from work on 1/18/23-1/19/23. He will return to work thereafter without restrictions. If you have any questions or concerns, or if I can be of further assistance, please do not hesitate to contact me.    Sincerely,     Flakita Ortiz MD

## 2023-01-18 NOTE — PATIENT INSTRUCTIONS
Seek immediate care in the emergency room in the event of severe headache, severe abdominal pain, chest pain, respiratory distress, fever unresponsive to antipyretic, dehydration, loss of consciousness, seizure.

## 2023-01-18 NOTE — PROGRESS NOTES
"Subjective:       Patient ID: Yash Miguel is a 32 y.o. male.    Vitals:  height is 5' 7" (1.702 m) and weight is 89.8 kg (198 lb). His oral temperature is 98.4 °F (36.9 °C). His blood pressure is 109/71 and his pulse is 84. His respiration is 14 and oxygen saturation is 98%.     Chief Complaint: Neck Pain and Headache    States his headache improved today; cannot recall taking Fioricet in the past but has multiple ED visits in the past for headache.  He would like ear irrigation.  Also reports poor sleeping position with pain in left side of back. The pain is aggravated by deep breathing and movement (twisting and bending). He has tried nothing for the symptoms.  Denies anorexia, arthralgias, belching, constipation, diarrhea, dysuria, fever, flatus, frequency, hematochezia, hematuria, melena, myalgias, vomiting or weight loss.There is no history of abdominal surgery, colon cancer, Crohn's disease, gallstones, GERD, irritable bowel syndrome, pancreatitis, PUD or ulcerative colitis. Patient's medical history does not include kidney stones and UTI.     Headache   This is a new problem. The current episode started in the past 7 days (2 days ago). The problem occurs constantly. The problem has been waxing and waning. The pain is located in the Frontal and temporal region. Radiates to: radiating towards L ear. The pain quality is not similar to prior headaches. The quality of the pain is described as squeezing. The pain is at a severity of 7/10. The pain is severe. Associated symptoms include ear pain, hearing loss, phonophobia and tinnitus (intermittent). Pertinent negatives include no abnormal behavior, coughing, drainage, facial sweating, muscle aches, rhinorrhea, scalp tenderness, sinus pressure, sore throat or swollen glands. Associated symptoms comments: Ear congestion. Treatments tried: ibuprofen. The treatment provided no relief. There is no history of cancer, cluster headaches, hypertension, " "immunosuppression, migraine headaches, migraines in the family, obesity, pseudotumor cerebri, recent head traumas, sinus disease or TMJ.     HENT:  Positive for ear pain, tinnitus (intermittent) and hearing loss. Negative for sinus pressure and sore throat.    Respiratory:  Negative for cough.    Neurological:  Positive for headaches. Negative for history of migraines.     Objective:      Vitals:    01/18/23 1000   BP: 109/71   Pulse: 84   Resp: 14   Temp: 98.4 °F (36.9 °C)   TempSrc: Oral   SpO2: 98%   Weight: 89.8 kg (198 lb)   Height: 5' 7" (1.702 m)      Physical Exam   Constitutional: He is oriented to person, place, and time.  Non-toxic appearance. He does not appear ill. No distress.   HENT:   Head: Atraumatic.   Ears:   Right Ear: impacted cerumen  Left Ear: impacted cerumen  Eyes: Conjunctivae are normal. Pupils are equal, round, and reactive to light.   Neck: Neck supple. Carotid bruit is not present. No neck rigidity present.   Cardiovascular: Normal rate, regular rhythm, normal heart sounds and normal pulses.   Pulmonary/Chest: Effort normal and breath sounds normal.   Abdominal: Bowel sounds are normal. Soft. There is no rebound.   Musculoskeletal:         General: Tenderness (left side back (thoracic)) present. No swelling, deformity or signs of injury.      Cervical back: He exhibits no tenderness.   Lymphadenopathy:     He has no cervical adenopathy.   Neurological: no focal deficit. He is alert and oriented to person, place, and time. No cranial nerve deficit or sensory deficit.   Skin: Skin is warm and not diaphoretic.   Psychiatric: Judgment and thought content normal.       Assessment:       1. Cervicogenic headache    2. Musculoskeletal pain    3. Bilateral impacted cerumen          Plan:         Cervicogenic headache  -     ketorolac injection 30 mg  -     butalbital-acetaminophen-caffeine -40 mg (FIORICET, ESGIC) -40 mg per tablet; Take 1 tablet by mouth 3 (three) times daily as " needed for Headaches.  Dispense: 30 tablet; Refill: 0    2. Musculoskeletal pain  -     ketorolac injection 30 mg  -     methocarbamoL (ROBAXIN) 500 MG Tab; Take 1 tablet (500 mg total) by mouth nightly as needed (muscle pain). Do not engage in activities which require full cognitive function if this medication makes you drowsy.  Dispense: 10 tablet; Refill: 0    3. Bilateral impacted cerumen  -     Ear wax removal    Patient Instructions   Seek immediate care in the emergency room in the event of severe headache, severe abdominal pain, chest pain, respiratory distress, fever unresponsive to antipyretic, dehydration, loss of consciousness, seizure.

## 2023-04-17 ENCOUNTER — OFFICE VISIT (OUTPATIENT)
Dept: PRIMARY CARE CLINIC | Facility: CLINIC | Age: 33
End: 2023-04-17
Payer: COMMERCIAL

## 2023-04-17 VITALS
WEIGHT: 233.94 LBS | TEMPERATURE: 98 F | RESPIRATION RATE: 18 BRPM | DIASTOLIC BLOOD PRESSURE: 78 MMHG | OXYGEN SATURATION: 99 % | SYSTOLIC BLOOD PRESSURE: 134 MMHG | BODY MASS INDEX: 36.72 KG/M2 | HEIGHT: 67 IN | HEART RATE: 84 BPM

## 2023-04-17 DIAGNOSIS — R22.1 LOCALIZED SWELLING, MASS OR LUMP OF NECK: ICD-10-CM

## 2023-04-17 DIAGNOSIS — Z76.89 ENCOUNTER TO ESTABLISH CARE: Primary | ICD-10-CM

## 2023-04-17 DIAGNOSIS — Z13.6 ENCOUNTER FOR SCREENING FOR CARDIOVASCULAR DISORDERS: ICD-10-CM

## 2023-04-17 DIAGNOSIS — M25.461 BILATERAL KNEE SWELLING: ICD-10-CM

## 2023-04-17 DIAGNOSIS — G89.29 CHRONIC LEFT SHOULDER PAIN: ICD-10-CM

## 2023-04-17 DIAGNOSIS — M25.512 CHRONIC LEFT SHOULDER PAIN: ICD-10-CM

## 2023-04-17 DIAGNOSIS — M25.462 BILATERAL KNEE SWELLING: ICD-10-CM

## 2023-04-17 PROCEDURE — 3078F PR MOST RECENT DIASTOLIC BLOOD PRESSURE < 80 MM HG: ICD-10-PCS | Mod: CPTII,S$GLB,, | Performed by: STUDENT IN AN ORGANIZED HEALTH CARE EDUCATION/TRAINING PROGRAM

## 2023-04-17 PROCEDURE — 3078F DIAST BP <80 MM HG: CPT | Mod: CPTII,S$GLB,, | Performed by: STUDENT IN AN ORGANIZED HEALTH CARE EDUCATION/TRAINING PROGRAM

## 2023-04-17 PROCEDURE — 3075F SYST BP GE 130 - 139MM HG: CPT | Mod: CPTII,S$GLB,, | Performed by: STUDENT IN AN ORGANIZED HEALTH CARE EDUCATION/TRAINING PROGRAM

## 2023-04-17 PROCEDURE — 1160F PR REVIEW ALL MEDS BY PRESCRIBER/CLIN PHARMACIST DOCUMENTED: ICD-10-PCS | Mod: CPTII,S$GLB,, | Performed by: STUDENT IN AN ORGANIZED HEALTH CARE EDUCATION/TRAINING PROGRAM

## 2023-04-17 PROCEDURE — 99214 PR OFFICE/OUTPT VISIT, EST, LEVL IV, 30-39 MIN: ICD-10-PCS | Mod: S$GLB,,, | Performed by: STUDENT IN AN ORGANIZED HEALTH CARE EDUCATION/TRAINING PROGRAM

## 2023-04-17 PROCEDURE — 1159F PR MEDICATION LIST DOCUMENTED IN MEDICAL RECORD: ICD-10-PCS | Mod: CPTII,S$GLB,, | Performed by: STUDENT IN AN ORGANIZED HEALTH CARE EDUCATION/TRAINING PROGRAM

## 2023-04-17 PROCEDURE — 3008F BODY MASS INDEX DOCD: CPT | Mod: CPTII,S$GLB,, | Performed by: STUDENT IN AN ORGANIZED HEALTH CARE EDUCATION/TRAINING PROGRAM

## 2023-04-17 PROCEDURE — 3075F PR MOST RECENT SYSTOLIC BLOOD PRESS GE 130-139MM HG: ICD-10-PCS | Mod: CPTII,S$GLB,, | Performed by: STUDENT IN AN ORGANIZED HEALTH CARE EDUCATION/TRAINING PROGRAM

## 2023-04-17 PROCEDURE — 99214 OFFICE O/P EST MOD 30 MIN: CPT | Mod: S$GLB,,, | Performed by: STUDENT IN AN ORGANIZED HEALTH CARE EDUCATION/TRAINING PROGRAM

## 2023-04-17 PROCEDURE — 3008F PR BODY MASS INDEX (BMI) DOCUMENTED: ICD-10-PCS | Mod: CPTII,S$GLB,, | Performed by: STUDENT IN AN ORGANIZED HEALTH CARE EDUCATION/TRAINING PROGRAM

## 2023-04-17 PROCEDURE — 99999 PR PBB SHADOW E&M-EST. PATIENT-LVL V: CPT | Mod: PBBFAC,,, | Performed by: STUDENT IN AN ORGANIZED HEALTH CARE EDUCATION/TRAINING PROGRAM

## 2023-04-17 PROCEDURE — 1159F MED LIST DOCD IN RCRD: CPT | Mod: CPTII,S$GLB,, | Performed by: STUDENT IN AN ORGANIZED HEALTH CARE EDUCATION/TRAINING PROGRAM

## 2023-04-17 PROCEDURE — 1160F RVW MEDS BY RX/DR IN RCRD: CPT | Mod: CPTII,S$GLB,, | Performed by: STUDENT IN AN ORGANIZED HEALTH CARE EDUCATION/TRAINING PROGRAM

## 2023-04-17 PROCEDURE — 99999 PR PBB SHADOW E&M-EST. PATIENT-LVL V: ICD-10-PCS | Mod: PBBFAC,,, | Performed by: STUDENT IN AN ORGANIZED HEALTH CARE EDUCATION/TRAINING PROGRAM

## 2023-04-17 NOTE — PROGRESS NOTES
Subjective:       Patient ID: Yash Miguel is a 32 y.o. male.    Chief Complaint: cyst on neck      HPI:  32 y.o. male presents to [facility] with complaints of mass to neck.    Acute concerns?: Patient reports mass to anterior left neck that was first noticed over past 3-4 years. Has been growing recently. Became uncomfortable over past 2-3 weeks. Feeling more than ever. Has been growing and regressing over time though. Some days doesn't notice.    Pain?: No, tightness  Discharge?: No    Reports history of tissue removal from left ariel in the past.    At times knees can feel swollen and heavy. Not always. Can worsen after long day. Elevation does help relieve. Hasn't tried knee brace or ace bandage. No pain, just swelling. Both knees.    Has been followed through Urgent care for left shoulder pain that has been present for quite some time.    Last PCP?: Unknown  Allergies: NKDA  Medical History: None  Medications: naproxen 500 mg bid  Surgical History: Cyst excision 2006  Family History: No cancers; no known autoimmune disease  Social History: Never smoker, no EtOH, no illicits    Fasting?: No  Hep C/HIV screening?: Declines  Last tetanus vaccine?: Unknown. Not today    Review of Systems   Constitutional:  Negative for chills, diaphoresis, fatigue and fever.   HENT:  Negative for congestion, sinus pressure, sneezing and sore throat.    Respiratory:  Negative for cough and shortness of breath.    Cardiovascular:  Negative for chest pain and palpitations.   Gastrointestinal:  Negative for abdominal pain, diarrhea, nausea and vomiting.   Musculoskeletal:  Positive for joint swelling (bilateral knees intermittent). Negative for arthralgias and myalgias.   Skin:  Negative for rash and wound.   Neurological:  Negative for dizziness and weakness.     Objective:      Vitals:    04/17/23 0934   BP: 134/78   BP Location: Left arm   Patient Position: Sitting   BP Method: Medium (Manual)   Pulse: 84   Resp: 18   Temp:  "97.7 °F (36.5 °C)   TempSrc: Temporal   SpO2: 99%   Weight: 106.1 kg (233 lb 14.5 oz)   Height: 5' 7" (1.702 m)     Physical Exam  Vitals reviewed.   Constitutional:       General: He is not in acute distress.     Appearance: Normal appearance. He is not ill-appearing.   HENT:      Head: Normocephalic and atraumatic.   Eyes:      General:         Right eye: No discharge.         Left eye: No discharge.      Conjunctiva/sclera: Conjunctivae normal.   Neck:      Thyroid: Thyroid mass and thyromegaly present. No thyroid tenderness.        Comments: Left thyroid enlargement with palpable, spherical, non-tender, ~0.6cm hard mass  Cardiovascular:      Rate and Rhythm: Normal rate and regular rhythm.      Pulses: Normal pulses.      Heart sounds: No murmur heard.  Pulmonary:      Effort: Pulmonary effort is normal.      Breath sounds: Normal breath sounds.   Musculoskeletal:         General: No deformity.      Cervical back: Neck supple. No rigidity.   Lymphadenopathy:      Cervical: No cervical adenopathy.   Skin:     General: Skin is warm and dry.      Coloration: Skin is not jaundiced.   Neurological:      General: No focal deficit present.      Mental Status: He is alert and oriented to person, place, and time.   Psychiatric:         Mood and Affect: Mood normal.         Behavior: Behavior normal.           Lab Results   Component Value Date     01/24/2023    K 4.3 01/24/2023     01/24/2023    CO2 24 01/24/2023    BUN 10 01/24/2023    CREATININE 1.1 01/24/2023    ANIONGAP 12 01/24/2023     No results found for: HGBA1C  Lab Results   Component Value Date    BNP <10 01/24/2023       Lab Results   Component Value Date    WBC 6.98 01/24/2023    HGB 14.4 01/24/2023    HCT 44.1 01/24/2023     01/24/2023    GRAN 4.3 01/24/2023    GRAN 61.2 01/24/2023     No results found for: CHOL, HDL, LDLCALC, TRIG       Current Outpatient Medications:     naproxen (NAPROSYN) 500 MG tablet, Take 1 tablet (500 mg total) by " mouth 2 (two) times daily with meals., Disp: 60 tablet, Rfl: 0        Assessment:       1. Encounter to establish care    2. Localized swelling, mass or lump of neck    3. Medication monitoring encounter    4. Encounter for screening for cardiovascular disorders    5. Chronic left shoulder pain    6. Bilateral knee swelling           Plan:       Encounter to establish care    Localized swelling, mass or lump of neck  -     Ambulatory referral/consult to ENT; Future; Expected date: 04/24/2023  -     TSH; Future; Expected date: 04/17/2023  -     T4, Free; Future; Expected date: 04/17/2023  -     T3; Future; Expected date: 04/17/2023  -     US Thyroid; Future; Expected date: 04/17/2023  - Will further evaluate with US imaging. Referral placed to ENT    Encounter for screening for cardiovascular disorders  -     Lipid Panel; Future; Expected date: 04/17/2023    Chronic left shoulder pain  -     Ambulatory referral/consult to Orthopedics; Future; Expected date: 04/24/2023  -     Ambulatory referral/consult to Physical/Occupational Therapy; Future; Expected date: 04/24/2023    Bilateral knee swelling  -     X-Ray Knee 3 View Bilateral; Future; Expected date: 04/17/2023    RTC in 3 months

## 2023-04-25 ENCOUNTER — ANCILLARY ORDERS (OUTPATIENT)
Dept: PRIMARY CARE CLINIC | Facility: CLINIC | Age: 33
End: 2023-04-25
Payer: COMMERCIAL

## 2023-04-25 DIAGNOSIS — R59.0 LOCALIZED ENLARGED LYMPH NODES: Primary | ICD-10-CM

## 2023-04-25 DIAGNOSIS — R22.1 LOCALIZED SWELLING, MASS OR LUMP OF NECK: ICD-10-CM

## 2023-04-25 DIAGNOSIS — I89.8 CALCIFIED LYMPH NODES: ICD-10-CM

## 2023-04-28 ENCOUNTER — TELEPHONE (OUTPATIENT)
Dept: ORTHOPEDICS | Facility: CLINIC | Age: 33
End: 2023-04-28
Payer: COMMERCIAL

## 2023-04-28 DIAGNOSIS — M25.512 LEFT SHOULDER PAIN, UNSPECIFIED CHRONICITY: Primary | ICD-10-CM

## 2023-04-28 NOTE — TELEPHONE ENCOUNTER
Spoke with pt. Advised pt he will need left shoulder xrays prior to clinic. Images ordered and scheduled. All questions answered. Pt verbalized understanding.

## 2023-04-30 ENCOUNTER — PATIENT MESSAGE (OUTPATIENT)
Dept: PRIMARY CARE CLINIC | Facility: CLINIC | Age: 33
End: 2023-04-30
Payer: COMMERCIAL

## 2023-05-01 ENCOUNTER — HOSPITAL ENCOUNTER (EMERGENCY)
Facility: HOSPITAL | Age: 33
Discharge: HOME | End: 2023-05-01
Attending: EMERGENCY MEDICINE
Payer: COMMERCIAL

## 2023-05-01 ENCOUNTER — NURSE TRIAGE (OUTPATIENT)
Dept: ADMINISTRATIVE | Facility: CLINIC | Age: 33
End: 2023-05-01
Payer: COMMERCIAL

## 2023-05-01 VITALS
DIASTOLIC BLOOD PRESSURE: 70 MMHG | SYSTOLIC BLOOD PRESSURE: 136 MMHG | TEMPERATURE: 99 F | BODY MASS INDEX: 36.41 KG/M2 | OXYGEN SATURATION: 98 % | WEIGHT: 232 LBS | HEIGHT: 67 IN | RESPIRATION RATE: 16 BRPM | HEART RATE: 76 BPM

## 2023-05-01 DIAGNOSIS — R51.9 ACUTE NONINTRACTABLE HEADACHE, UNSPECIFIED HEADACHE TYPE: ICD-10-CM

## 2023-05-01 DIAGNOSIS — E23.6 EMPTY SELLA: ICD-10-CM

## 2023-05-01 DIAGNOSIS — R42 VERTIGO: ICD-10-CM

## 2023-05-01 DIAGNOSIS — R09.81 NASAL CONGESTION: ICD-10-CM

## 2023-05-01 DIAGNOSIS — R11.2 NAUSEA AND VOMITING, UNSPECIFIED VOMITING TYPE: ICD-10-CM

## 2023-05-01 DIAGNOSIS — R22.1 MASS OF LEFT SIDE OF NECK: ICD-10-CM

## 2023-05-01 DIAGNOSIS — R42 DIZZINESS: Primary | ICD-10-CM

## 2023-05-01 LAB
ALBUMIN SERPL BCP-MCNC: 4.3 G/DL (ref 3.5–5.2)
ALP SERPL-CCNC: 60 U/L (ref 55–135)
ALT SERPL W/O P-5'-P-CCNC: 21 U/L (ref 10–44)
ANION GAP SERPL CALC-SCNC: 12 MMOL/L (ref 8–16)
AST SERPL-CCNC: 26 U/L (ref 10–40)
BASOPHILS # BLD AUTO: 0.04 K/UL (ref 0–0.2)
BASOPHILS NFR BLD: 0.4 % (ref 0–1.9)
BILIRUB SERPL-MCNC: 0.3 MG/DL (ref 0.1–1)
BUN SERPL-MCNC: 9 MG/DL (ref 6–20)
CALCIUM SERPL-MCNC: 10.1 MG/DL (ref 8.7–10.5)
CHLORIDE SERPL-SCNC: 104 MMOL/L (ref 95–110)
CO2 SERPL-SCNC: 21 MMOL/L (ref 23–29)
CREAT SERPL-MCNC: 0.9 MG/DL (ref 0.5–1.4)
DIFFERENTIAL METHOD: ABNORMAL
EOSINOPHIL # BLD AUTO: 0 K/UL (ref 0–0.5)
EOSINOPHIL NFR BLD: 0.3 % (ref 0–8)
ERYTHROCYTE [DISTWIDTH] IN BLOOD BY AUTOMATED COUNT: 12.6 % (ref 11.5–14.5)
EST. GFR  (NO RACE VARIABLE): >60 ML/MIN/1.73 M^2
GLUCOSE SERPL-MCNC: 123 MG/DL (ref 70–110)
HCT VFR BLD AUTO: 42.7 % (ref 40–54)
HCV AB SERPL QL IA: NORMAL
HGB BLD-MCNC: 13.3 G/DL (ref 14–18)
HIV 1+2 AB+HIV1 P24 AG SERPL QL IA: NORMAL
IMM GRANULOCYTES # BLD AUTO: 0.04 K/UL (ref 0–0.04)
IMM GRANULOCYTES NFR BLD AUTO: 0.4 % (ref 0–0.5)
LIPASE SERPL-CCNC: 36 U/L (ref 4–60)
LYMPHOCYTES # BLD AUTO: 2.2 K/UL (ref 1–4.8)
LYMPHOCYTES NFR BLD: 23.1 % (ref 18–48)
MCH RBC QN AUTO: 27.3 PG (ref 27–31)
MCHC RBC AUTO-ENTMCNC: 31.1 G/DL (ref 32–36)
MCV RBC AUTO: 88 FL (ref 82–98)
MONOCYTES # BLD AUTO: 0.8 K/UL (ref 0.3–1)
MONOCYTES NFR BLD: 8.4 % (ref 4–15)
NEUTROPHILS # BLD AUTO: 6.5 K/UL (ref 1.8–7.7)
NEUTROPHILS NFR BLD: 67.4 % (ref 38–73)
NRBC BLD-RTO: 0 /100 WBC
PLATELET # BLD AUTO: 375 K/UL (ref 150–450)
PMV BLD AUTO: 9.6 FL (ref 9.2–12.9)
POTASSIUM SERPL-SCNC: 4 MMOL/L (ref 3.5–5.1)
PROT SERPL-MCNC: 8.2 G/DL (ref 6–8.4)
RBC # BLD AUTO: 4.87 M/UL (ref 4.6–6.2)
SODIUM SERPL-SCNC: 137 MMOL/L (ref 136–145)
WBC # BLD AUTO: 9.58 K/UL (ref 3.9–12.7)

## 2023-05-01 PROCEDURE — 93010 ELECTROCARDIOGRAM REPORT: CPT | Mod: ,,, | Performed by: INTERNAL MEDICINE

## 2023-05-01 PROCEDURE — 87389 HIV-1 AG W/HIV-1&-2 AB AG IA: CPT | Performed by: PHYSICIAN ASSISTANT

## 2023-05-01 PROCEDURE — 63600175 PHARM REV CODE 636 W HCPCS: Performed by: PHYSICIAN ASSISTANT

## 2023-05-01 PROCEDURE — 93010 EKG 12-LEAD: ICD-10-PCS | Mod: ,,, | Performed by: INTERNAL MEDICINE

## 2023-05-01 PROCEDURE — 25000003 PHARM REV CODE 250: Performed by: PHYSICIAN ASSISTANT

## 2023-05-01 PROCEDURE — 96361 HYDRATE IV INFUSION ADD-ON: CPT

## 2023-05-01 PROCEDURE — 83690 ASSAY OF LIPASE: CPT | Performed by: PHYSICIAN ASSISTANT

## 2023-05-01 PROCEDURE — 96374 THER/PROPH/DIAG INJ IV PUSH: CPT

## 2023-05-01 PROCEDURE — 93005 ELECTROCARDIOGRAM TRACING: CPT

## 2023-05-01 PROCEDURE — 99285 EMERGENCY DEPT VISIT HI MDM: CPT | Mod: 25

## 2023-05-01 PROCEDURE — 85025 COMPLETE CBC W/AUTO DIFF WBC: CPT | Performed by: PHYSICIAN ASSISTANT

## 2023-05-01 PROCEDURE — 99284 PR EMERGENCY DEPT VISIT,LEVEL IV: ICD-10-PCS | Mod: ,,, | Performed by: EMERGENCY MEDICINE

## 2023-05-01 PROCEDURE — 80053 COMPREHEN METABOLIC PANEL: CPT | Performed by: PHYSICIAN ASSISTANT

## 2023-05-01 PROCEDURE — 86803 HEPATITIS C AB TEST: CPT | Performed by: PHYSICIAN ASSISTANT

## 2023-05-01 PROCEDURE — 99284 EMERGENCY DEPT VISIT MOD MDM: CPT | Mod: ,,, | Performed by: EMERGENCY MEDICINE

## 2023-05-01 RX ORDER — ONDANSETRON 4 MG/1
4 TABLET, ORALLY DISINTEGRATING ORAL EVERY 6 HOURS PRN
Qty: 15 TABLET | Refills: 0 | Status: SHIPPED | OUTPATIENT
Start: 2023-05-01 | End: 2023-05-31 | Stop reason: ALTCHOICE

## 2023-05-01 RX ORDER — DIAZEPAM 2 MG/1
2 TABLET ORAL
Status: COMPLETED | OUTPATIENT
Start: 2023-05-01 | End: 2023-05-01

## 2023-05-01 RX ORDER — DIAZEPAM 2 MG/1
2 TABLET ORAL EVERY 6 HOURS PRN
Qty: 15 TABLET | Refills: 0 | Status: SHIPPED | OUTPATIENT
Start: 2023-05-01 | End: 2023-05-31 | Stop reason: ALTCHOICE

## 2023-05-01 RX ORDER — MECLIZINE HCL 12.5 MG 12.5 MG/1
25 TABLET ORAL
Status: COMPLETED | OUTPATIENT
Start: 2023-05-01 | End: 2023-05-01

## 2023-05-01 RX ORDER — ONDANSETRON 2 MG/ML
4 INJECTION INTRAMUSCULAR; INTRAVENOUS
Status: COMPLETED | OUTPATIENT
Start: 2023-05-01 | End: 2023-05-01

## 2023-05-01 RX ADMIN — ONDANSETRON 4 MG: 2 INJECTION INTRAMUSCULAR; INTRAVENOUS at 11:05

## 2023-05-01 RX ADMIN — DIAZEPAM 2 MG: 2 TABLET ORAL at 02:05

## 2023-05-01 RX ADMIN — SODIUM CHLORIDE 1000 ML: 9 INJECTION, SOLUTION INTRAVENOUS at 11:05

## 2023-05-01 RX ADMIN — MECLIZINE HYDROCHLORIDE 25 MG: 12.5 TABLET ORAL at 11:05

## 2023-05-01 NOTE — TELEPHONE ENCOUNTER
Pt calling, states he feels like the lump in his neck is getting bigger and is uncomfortable, states he feels like it is causing his other symptoms. Reports n/v and dizziness. State she has to hold on to the wall to walk he is so dizzy. Reports he has been feeling this way for 3 days. States he has vomited 4 times. Per protocol advised to ED NOW. Pt state he just went to the ER, advised I can try to contact PCP office. verbalized understanding. Attempted PCP office, not available at this time, advised to ED NOW. verbalized understanding. Denies any further questions or concerns at this time, advised to call back if they have any that come up. Advised pt to call back with any other concerns or worsening symptoms. Verbalized understanding and will route message to provider.     Reason for Disposition   SEVERE dizziness (e.g., unable to stand, requires support to walk, feels like passing out now)    Additional Information   Negative: SEVERE difficulty breathing (e.g., struggling for each breath, speaks in single words)   Negative: Shock suspected (e.g., cold/pale/clammy skin, too weak to stand, low BP, rapid pulse)   Negative: Difficult to awaken or acting confused (e.g., disoriented, slurred speech)   Negative: Fainted, and still feels dizzy afterwards   Negative: Overdose (accidental or intentional) of medications   Negative: New neurologic deficit that is present now: * Weakness of the face, arm, or leg on one side of the body * Numbness of the face, arm, or leg on one side of the body * Loss of speech or garbled speech   Negative: Heart beating < 50 beats per minute OR > 140 beats per minute   Negative: Sounds like a life-threatening emergency to the triager    Protocols used: Dizziness-A-OH

## 2023-05-01 NOTE — ED TRIAGE NOTES
Pt complains of dizziness , nausea and vomiting for past 3 days Pt also complains of knot on neck for approx. 5 years and has recently increased in size

## 2023-05-01 NOTE — ED NOTES
Pt identifiers Yash Miguel were checked and are correct  LOC: The patient is awake, alert, aware of environment with an appropriate affect. Oriented x4, speaking appropriately  APPEARANCE: Pt states knot on neck creates a heavy feeling in his throat, in no acute distress, pt is clean and well groomed, clothing properly fastened  SKIN: Skin warm, dry and intact, normal skin turgor, moist mucus membranes  Large knot noted on neck   RESPIRATORY: Airway is open and patent, respirations are spontaneous, even and unlabored, normal effort and rate  CARDIAC: Normal rate and rhythm, no peripheral edema noted, capillary refill < 3 seconds, bilateral radial pulses 2+  ABDOMEN: Soft, nontender, nondistended.   NEUROLOGIC: PERRL, facial expression is symmetrical, patient moving all extremities spontaneously, normal sensation in all extremities when touched with a finger.  Follows all commands appropriately  MUSCULOSKELETAL: No obvious deformities.

## 2023-05-01 NOTE — PROVIDER PROGRESS NOTES - EMERGENCY DEPT.
Encounter Date: 5/1/2023    ED Physician Progress Notes          ED Physician Hand-off Note:    ED Course: I assumed care of patient from off-going ED physician team. Briefly, Patient is a 32-year-old male presenting for dizziness.    At the time of signout plan was pending MRI of the brain.    Medications given in the ED:    Medications   sodium chloride 0.9% bolus 1,000 mL 1,000 mL (0 mLs Intravenous Stopped 5/1/23 1249)   ondansetron injection 4 mg (4 mg Intravenous Given 5/1/23 1117)   meclizine tablet 25 mg (25 mg Oral Given 5/1/23 1139)   diazePAM tablet 2 mg (2 mg Oral Given 5/1/23 1430)     MRI without evidence of CVA.  Does show empty sella with loss of normal flow to the left sigmoid and jugular vein which could be find in IIH.  Patient states that he is history of headaches but none currently.  No visual changes.  I discussed this finding with him.  I do not think that his presentation today is consistent with IIH, however he could have some underlying anomaly therefore will refer to Neurology for follow-up.  Will also refer to ENT and instruct patient to return to the ED for any worsening symptoms. Stressed the importance of follow-up, strict ED return precautions given.  Patient voiced understanding and is comfortable with discharge.

## 2023-05-01 NOTE — Clinical Note
"Yash"La Miguel was seen and treated in our emergency department on 5/1/2023.  He may return to work on 05/08/2023.       If you have any questions or concerns, please don't hesitate to call.      Arianna Jackson PA-C"

## 2023-05-01 NOTE — DISCHARGE INSTRUCTIONS
Diagnosis: Vertigo, empty sella    I think that your dizziness is caused by peripheral vertigo which might be related to an inner ear issue.  Your MRI showed thing called and empty sella which can be associated with a condition called idiopathic intracranial hypertension or pseudotumor cerebri.  This usually causes headaches and blurred vision and do not think this is consistent with your presentation today.  I am prescribing medicine for nausea and vertigo that you can take as needed.  Do not take Valium while driving and do not drink alcohol while taking this medication.    I sent referrals to our ENT and neurology doctors for follow-up.  Please call to schedule follow-up appointments.  If you start to have any worsening symptoms, please return to the emergency department.

## 2023-05-01 NOTE — ED NOTES
Pt attempted to ambulate to bathroom. Unable, appeared as if he was about to fall. Placed in wheelchair. Notified provider.

## 2023-05-01 NOTE — ED PROVIDER NOTES
Encounter Date: 5/1/2023       History     Chief Complaint   Patient presents with    Neck Swelling     Seen for neck swelling and  ultrasound, dizzy since friday     The patient is a 32 year old male. He returns to the ER c/o dizziness with associated nausea, vomiting, headache, and sinus congestion. He states that symptoms have been present for the past 3 days. He states that the dizziness is worse when he stands up or turns his head. He states that movement makes him nauseated. He was seen at an outside ER for this 2 days ago. He had a work up including negative Covid and negative head CT. He was given a Rx for Meclizine, but states that he did not fill/start it.     Also, he reports that he is being worked up outpatient for left sided neck mass/swelling that has been present for several years that has gradually progressively gotten worse/larger. He had negative thyroid tests and an US of his neck completed that showed calcified lymph nodes. He is scheduled for a contrasted CT of his neck on 5/8 for further evaluation. with complaint of headache, constant dizziness and vomiting beginning yesterday.  The headache is a frontal headache with sinus congestion.  He denies fever, diarrhea, abdominal pain, cough, chills, body aches, head injury and dysuria.  He has no sick contacts.  He also denies chest pain or SOB.  No alleviating or aggravating factors.     Review of patient's allergies indicates:  No Known Allergies  History reviewed. No pertinent past medical history.  History reviewed. No pertinent surgical history.  Family History   Problem Relation Age of Onset    No Known Problems Mother     No Known Problems Father      Social History     Tobacco Use    Smoking status: Never    Smokeless tobacco: Never   Substance Use Topics    Alcohol use: No    Drug use: No     Review of Systems   Constitutional:  Negative for activity change, chills, diaphoresis and fever.   HENT:  Positive for congestion, rhinorrhea and  sinus pressure. Negative for ear discharge, ear pain, facial swelling, nosebleeds, sore throat, tinnitus, trouble swallowing and voice change.    Eyes:  Negative for pain, redness and visual disturbance.   Respiratory:  Negative for cough, chest tightness and shortness of breath.    Cardiovascular:  Negative for chest pain, palpitations and leg swelling.   Gastrointestinal:  Positive for nausea and vomiting. Negative for abdominal pain, blood in stool, constipation and diarrhea.   Genitourinary:  Negative for decreased urine volume, difficulty urinating, dysuria, flank pain and hematuria.   Musculoskeletal:  Negative for back pain, gait problem, myalgias, neck pain and neck stiffness.   Skin:  Negative for color change and rash.   Allergic/Immunologic: Negative for immunocompromised state.   Neurological:  Positive for dizziness and headaches. Negative for tremors, seizures, syncope, facial asymmetry, speech difficulty, weakness, light-headedness and numbness.   Psychiatric/Behavioral:  Negative for confusion.      Physical Exam     Initial Vitals [05/01/23 1018]   BP Pulse Resp Temp SpO2   120/60 77 18 98.7 °F (37.1 °C) 99 %      MAP       --         Physical Exam    Nursing note and vitals reviewed.  Constitutional: He appears well-developed and well-nourished. He is not diaphoretic. He appears distressed.   Alert and interactive. Accompanied by wife. Appears uncomfortable.    HENT:   Head: Normocephalic.   Nasal sinus congestion. No mastoid tenderness. Bilateral cerumen impactions. No otorrhea. Benign oropharynx.    Eyes: Conjunctivae and EOM are normal. Pupils are equal, round, and reactive to light. Right eye exhibits no discharge. Left eye exhibits no discharge.   Conjugate gaze. Pupils equal round and reactive to light. EOMI intact. No nystagmus appreciated. No proptosis. No ptosis. No injection or chemosis. Sclera white.    Neck: Neck supple. No tracheal deviation present. No JVD present.   There is an  obvious localized are of swelling to left lateral neck, firm, mobile, and non-tender, not erythematous, fluctuant, or indurated - recently had outpatient US showing that this is multiple calcified lymph nodes per report.    Normal range of motion.  Cardiovascular:  Normal rate and intact distal pulses.           Pulmonary/Chest: No stridor. No respiratory distress. He has no wheezes.   Abdominal: Abdomen is soft. He exhibits no distension. There is no abdominal tenderness.   Benign abdomen  There is no rebound and no guarding.   Musculoskeletal:         General: No tenderness or edema. Normal range of motion.      Cervical back: Normal range of motion and neck supple.     Neurological: He is alert and oriented to person, place, and time. He has normal strength. No sensory deficit.   No facial droop. No slurred speech. 5/5 strength extremities x 4. Oriented appropriately. Unable to assess gait - becomes dizzy and nauseated when he turns head or sits up.        ED Course   Procedures  Labs Reviewed   CBC W/ AUTO DIFFERENTIAL - Abnormal; Notable for the following components:       Result Value    Hemoglobin 13.3 (*)     MCHC 31.1 (*)     All other components within normal limits   COMPREHENSIVE METABOLIC PANEL - Abnormal; Notable for the following components:    CO2 21 (*)     Glucose 123 (*)     All other components within normal limits   HIV 1 / 2 ANTIBODY    Narrative:     Release to patient->Immediate   HEPATITIS C ANTIBODY    Narrative:     Release to patient->Immediate   LIPASE     Results for orders placed or performed during the hospital encounter of 05/01/23   HIV 1/2 Ag/Ab (4th Gen)   Result Value Ref Range    HIV 1/2 Ag/Ab Non-reactive Non-reactive   Hepatitis C Antibody   Result Value Ref Range    Hepatitis C Ab Non-reactive Non-reactive   CBC auto differential   Result Value Ref Range    WBC 9.58 3.90 - 12.70 K/uL    RBC 4.87 4.60 - 6.20 M/uL    Hemoglobin 13.3 (L) 14.0 - 18.0 g/dL    Hematocrit 42.7 40.0  - 54.0 %    MCV 88 82 - 98 fL    MCH 27.3 27.0 - 31.0 pg    MCHC 31.1 (L) 32.0 - 36.0 g/dL    RDW 12.6 11.5 - 14.5 %    Platelets 375 150 - 450 K/uL    MPV 9.6 9.2 - 12.9 fL    Immature Granulocytes 0.4 0.0 - 0.5 %    Gran # (ANC) 6.5 1.8 - 7.7 K/uL    Immature Grans (Abs) 0.04 0.00 - 0.04 K/uL    Lymph # 2.2 1.0 - 4.8 K/uL    Mono # 0.8 0.3 - 1.0 K/uL    Eos # 0.0 0.0 - 0.5 K/uL    Baso # 0.04 0.00 - 0.20 K/uL    nRBC 0 0 /100 WBC    Gran % 67.4 38.0 - 73.0 %    Lymph % 23.1 18.0 - 48.0 %    Mono % 8.4 4.0 - 15.0 %    Eosinophil % 0.3 0.0 - 8.0 %    Basophil % 0.4 0.0 - 1.9 %    Differential Method Automated    Comprehensive metabolic panel   Result Value Ref Range    Sodium 137 136 - 145 mmol/L    Potassium 4.0 3.5 - 5.1 mmol/L    Chloride 104 95 - 110 mmol/L    CO2 21 (L) 23 - 29 mmol/L    Glucose 123 (H) 70 - 110 mg/dL    BUN 9 6 - 20 mg/dL    Creatinine 0.9 0.5 - 1.4 mg/dL    Calcium 10.1 8.7 - 10.5 mg/dL    Total Protein 8.2 6.0 - 8.4 g/dL    Albumin 4.3 3.5 - 5.2 g/dL    Total Bilirubin 0.3 0.1 - 1.0 mg/dL    Alkaline Phosphatase 60 55 - 135 U/L    AST 26 10 - 40 U/L    ALT 21 10 - 44 U/L    Anion Gap 12 8 - 16 mmol/L    eGFR >60.0 >60 mL/min/1.73 m^2   Lipase   Result Value Ref Range    Lipase 36 4 - 60 U/L          ECG Results              EKG 12-lead (Final result)  Result time 05/01/23 12:51:18      Final result by Interface, Lab In [facility] (05/01/23 12:51:18)                   Narrative:    Test Reason : R42,    Vent. Rate : 077 BPM     Atrial Rate : 077 BPM     P-R Int : 144 ms          QRS Dur : 088 ms      QT Int : 356 ms       P-R-T Axes : 020 056 053 degrees     QTc Int : 402 ms    Normal sinus rhythm  Normal ECG  When compared with ECG of 29-APR-2023 17:04,  No significant change was found  Confirmed by Zenon ARELLANO, Ysabel ([id number]) on 5/1/2023 12:51:11 PM    Referred By: JASSON   SELF           Confirmed By:Ysabel Yarbrough MD                                  Vitals:    05/01/23 1018 05/01/23 1232  "05/01/23 1436 05/01/23 1842   BP: 120/60 127/63 (!) 131/59 136/70   BP Location:  Left arm Right arm    Patient Position:  Sitting Sitting    Pulse: 77 71 79 76   Resp: 18 16 18 16   Temp: 98.7 °F (37.1 °C) 97.7 °F (36.5 °C) 98 °F (36.7 °C) 98.6 °F (37 °C)   TempSrc: Oral Oral Oral    SpO2: 99% 100% 99% 98%   Weight: 105.2 kg (232 lb)      Height: 5' 7" (1.702 m)          Imaging Results              MRI Brain Without Contrast (Final result)  Result time 05/01/23 18:12:00      Final result by Sean Martinez MD (05/01/23 18:12:00)                   Impression:      No parenchymal signal abnormality.    No stroke or acute intracranial hemorrhage.    Partially empty sella and loss of the normal flow void in the left sigmoid to jugular vein suggestive of slow flow.  Findings are nonspecific but can be associated with idiopathic intracranial hypotension in young patients.  Suggest clinical correlation.    Electronically signed by resident: Angi Frias  Date:    05/01/2023  Time:    17:37    Electronically signed by: Sean Martinez MD  Date:    05/01/2023  Time:    18:12               Narrative:    EXAMINATION:  MRI BRAIN WITHOUT CONTRAST    CLINICAL HISTORY:  Dizziness, non-specific;.    TECHNIQUE:  Multiplanar multisequence MR imaging of the brain was performed without contrast.    COMPARISON:  CT head 04/29/2023    FINDINGS:  Intracranial compartment:    The ventricles and sulci are normal in size for age without evidence of hydrocephalus. No extra-axial blood or fluid collections.  No acute hemorrhage edema or acute infarct.    Partially empty sella.    Loss of the normal flow void in the left sigmoid to jugular vein, likely due to slow flow.  The remainder of the intracranial flow voids are within normal limits.    Skull/extracranial contents (limited evaluation): Bone marrow signal intensity is normal.  There is a right-sided intraparotid lymph node.  There is a small amount of fluid within the right maxillary " sinus.                                       Medications   sodium chloride 0.9% bolus 1,000 mL 1,000 mL (0 mLs Intravenous Stopped 5/1/23 1249)   ondansetron injection 4 mg (4 mg Intravenous Given 5/1/23 1117)   meclizine tablet 25 mg (25 mg Oral Given 5/1/23 1139)   diazePAM tablet 2 mg (2 mg Oral Given 5/1/23 1430)     Medical Decision Making:   History:   Old Medical Records: I decided to obtain old medical records.  Old Records Summarized: records from clinic visits.  Initial Assessment:   33 yo male, presents for 2nd ER c/o positional dizziness with associated nausea and vomiting. Reports nasal congestion and headaches. Currently being evaluated outpatient for left neck mass that has been present for 3-4 years, US showing calcified lymph nodes, likely unrelated to acute symptoms, scheduled for outpatient neck CT next week.   Differential Diagnosis:   Vertigo, BPV, sinus headache, dysequilibrium, CVA, dysrhythmia, dehydration, orthostasis, electrolyte derangement, etc   Clinical Tests:   Lab Tests: Ordered and Reviewed  Radiological Study: Ordered and Reviewed  Medical Tests: Ordered and Reviewed  ED Management:  Vital signs reviewed   Records reviewed   Dizziness seems to be positional - goes away when sitting still - becomes very dizzy and nauseated when he turns head or attempts standing   He was evaluated for this at an outside ER and had benign labs and head CT  - treated for sinusitis, vertigo with meclizine and Decadron, but did not fill attempt Meclizine   EKG completed - NSR - unremarkable   Pt vomiting in intake - given IV fluid bolus and Zofran   After nausea subsided, pt was given PO Meclizine   Pt did report feeling better on re-assessment after Fluids and Meclizine - attempted to ambulate to assess and he became dizzy again had to sit down   Discussed with attending - will give Valium and proceed with MRI brain   I gave report and signed outpatient to oncoming provider who will assume further care  and management due to persistent dizziness and pending MRI brain   Pt updated on plan   Additional MDM:     EKG: I have independently interpreted EKG(s) - see notes., EKG - Independent Interpretation - Normal sinus rhythm, normal rate, no acute changes. EKG interpreted and signed by ER attending physician - no acute ischemic changes reported         Attending Attestation:     Physician Attestation Statement for NP/PA:   I discussed this assessment and plan of this patient with the NP/PA, but I did not personally examine the patient. The face to face encounter was performed by the NP/PA.    Other NP/PA Attestation Additions:      Medical Decision Making: I completed the substantive portion of the MDM                        Clinical Impression:   Final diagnoses:  [R42] Dizziness (Primary)  [E23.6] Empty sella  [R11.2] Nausea and vomiting, unspecified vomiting type  [R42] Vertigo  [R22.1] Mass of left side of neck  [R09.81] Nasal congestion  [R51.9] Acute nonintractable headache, unspecified headache type        ED Disposition Condition    Discharge Stable          ED Prescriptions       Medication Sig Dispense Start Date End Date Auth. Provider    ondansetron (ZOFRAN-ODT) 4 MG TbDL Take 1 tablet (4 mg total) by mouth every 6 (six) hours as needed (Nausea). 15 tablet 5/1/2023 -- Arianna Jackson PA-C    diazePAM (VALIUM) 2 MG tablet Take 1 tablet (2 mg total) by mouth every 6 (six) hours as needed (dizziness). 15 tablet 5/1/2023 5/31/2023 Arianna Jackson PA-C          Follow-up Information       Follow up With Specialties Details Why Contact Info Additional Information    [facility] 7th Fl Neurology Schedule an appointment as soon as possible for a visit in 1 week  [address]  [facility] [address] [phone] [facility] - Main Building, 7th Floor Please park in [facility] and take Clinic elevator    [facility] [place] Schedule an appointment as soon as  possible for a visit in 1 week  [address] [facility] [address]  [phone]     [facility] Go to  If symptoms worsen [address] [facility] [id number] [phone]              Miguel A Bernard PA-C  05/02/23 1803       Miguel A Bernard PA-C  05/02/23 1804       Crystal Garza MD  05/02/23 2000

## 2023-05-02 ENCOUNTER — TELEPHONE (OUTPATIENT)
Dept: OTOLARYNGOLOGY | Facility: CLINIC | Age: 33
End: 2023-05-02
Payer: COMMERCIAL

## 2023-05-02 NOTE — TELEPHONE ENCOUNTER
----- Message from Jerry Mtz sent at 5/2/2023 10:27 AM CDT -----  Contact: pt  Pt requesting call back RE: Pt states he has Vertigo and would like to know if getting his ears cleaned would like it better, please call to further discuss    Confirmed contact below:  Contact Name:Yash Miguel  Phone Number: [phone]

## 2023-05-02 NOTE — TELEPHONE ENCOUNTER
Called pt back. Pt states that he has been having vertigo and wanted to know what to do about it. Advised that he would need to see Dr. Morillo for evaluation. Pt states that he actually has an appt with Dr. Morillo on 5/19/23 for a neck mass. Advised that Dr. Morillo can see pt for both concerns. Offered pt earlier appt, this Tuesday morning, 5/9/23. Pt has CT scan on 5/8/23 to evaluate the neck mass. Pt states that he will check with his supervisor and call back if able to come in on Tuesday. Thanks, Arlene

## 2023-05-06 ENCOUNTER — PATIENT MESSAGE (OUTPATIENT)
Dept: PRIMARY CARE CLINIC | Facility: CLINIC | Age: 33
End: 2023-05-06
Payer: COMMERCIAL

## 2023-05-08 ENCOUNTER — TELEPHONE (OUTPATIENT)
Dept: PRIMARY CARE CLINIC | Facility: CLINIC | Age: 33
End: 2023-05-08
Payer: COMMERCIAL

## 2023-05-08 ENCOUNTER — OFFICE VISIT (OUTPATIENT)
Dept: PRIMARY CARE CLINIC | Facility: CLINIC | Age: 33
End: 2023-05-08
Payer: COMMERCIAL

## 2023-05-08 VITALS
SYSTOLIC BLOOD PRESSURE: 130 MMHG | DIASTOLIC BLOOD PRESSURE: 72 MMHG | WEIGHT: 232.25 LBS | RESPIRATION RATE: 18 BRPM | HEIGHT: 67 IN | OXYGEN SATURATION: 96 % | TEMPERATURE: 98 F | BODY MASS INDEX: 36.45 KG/M2 | HEART RATE: 76 BPM

## 2023-05-08 DIAGNOSIS — R22.1 LOCALIZED SWELLING, MASS OR LUMP OF NECK: ICD-10-CM

## 2023-05-08 DIAGNOSIS — R42 VERTIGO: Primary | ICD-10-CM

## 2023-05-08 PROCEDURE — 1160F RVW MEDS BY RX/DR IN RCRD: CPT | Mod: CPTII,S$GLB,, | Performed by: FAMILY MEDICINE

## 2023-05-08 PROCEDURE — 3075F PR MOST RECENT SYSTOLIC BLOOD PRESS GE 130-139MM HG: ICD-10-PCS | Mod: CPTII,S$GLB,, | Performed by: FAMILY MEDICINE

## 2023-05-08 PROCEDURE — 3008F PR BODY MASS INDEX (BMI) DOCUMENTED: ICD-10-PCS | Mod: CPTII,S$GLB,, | Performed by: FAMILY MEDICINE

## 2023-05-08 PROCEDURE — 3075F SYST BP GE 130 - 139MM HG: CPT | Mod: CPTII,S$GLB,, | Performed by: FAMILY MEDICINE

## 2023-05-08 PROCEDURE — 99999 PR PBB SHADOW E&M-EST. PATIENT-LVL III: ICD-10-PCS | Mod: PBBFAC,,, | Performed by: FAMILY MEDICINE

## 2023-05-08 PROCEDURE — 3078F DIAST BP <80 MM HG: CPT | Mod: CPTII,S$GLB,, | Performed by: FAMILY MEDICINE

## 2023-05-08 PROCEDURE — 3008F BODY MASS INDEX DOCD: CPT | Mod: CPTII,S$GLB,, | Performed by: FAMILY MEDICINE

## 2023-05-08 PROCEDURE — 3078F PR MOST RECENT DIASTOLIC BLOOD PRESSURE < 80 MM HG: ICD-10-PCS | Mod: CPTII,S$GLB,, | Performed by: FAMILY MEDICINE

## 2023-05-08 PROCEDURE — 99214 OFFICE O/P EST MOD 30 MIN: CPT | Mod: S$GLB,,, | Performed by: FAMILY MEDICINE

## 2023-05-08 PROCEDURE — 99214 PR OFFICE/OUTPT VISIT, EST, LEVL IV, 30-39 MIN: ICD-10-PCS | Mod: S$GLB,,, | Performed by: FAMILY MEDICINE

## 2023-05-08 PROCEDURE — 1159F PR MEDICATION LIST DOCUMENTED IN MEDICAL RECORD: ICD-10-PCS | Mod: CPTII,S$GLB,, | Performed by: FAMILY MEDICINE

## 2023-05-08 PROCEDURE — 99999 PR PBB SHADOW E&M-EST. PATIENT-LVL III: CPT | Mod: PBBFAC,,, | Performed by: FAMILY MEDICINE

## 2023-05-08 PROCEDURE — 1159F MED LIST DOCD IN RCRD: CPT | Mod: CPTII,S$GLB,, | Performed by: FAMILY MEDICINE

## 2023-05-08 PROCEDURE — 1160F PR REVIEW ALL MEDS BY PRESCRIBER/CLIN PHARMACIST DOCUMENTED: ICD-10-PCS | Mod: CPTII,S$GLB,, | Performed by: FAMILY MEDICINE

## 2023-05-08 RX ORDER — MECLIZINE HYDROCHLORIDE 25 MG/1
25 TABLET ORAL 3 TIMES DAILY PRN
Qty: 30 TABLET | Refills: 1 | Status: SHIPPED | OUTPATIENT
Start: 2023-05-08 | End: 2023-05-31 | Stop reason: ALTCHOICE

## 2023-05-08 NOTE — LETTER
May 8, 2023    Yash Miguel  [address] [place] [address]             [facility] [address], [facility] [address]  [facility] [address]  Phone: [phone]  Fax: [phone] To whom it may concern:    Please excused Mr. Miguel's ongoing absence from work. He may return to normal activities without restrictions on Monday, 5/15/23.    Respectfully,            Manuel Santacruz MD

## 2023-05-08 NOTE — PROGRESS NOTES
"Subjective:       Patient ID: Yash Miguel is a 32 y.o. male.    Chief Complaint: Follow-up    Works for [employer] and has to drive, doesn't feel he can safely return to work tomorrow as previously advised    Follow-up  Associated symptoms include nausea and vomiting (resolved). Pertinent negatives include no chest pain, fever or headaches.   Dizziness:   Chronicity:  New  Onset:  1 to 4 weeks ago  Progression since onset:  Gradually improving  Pain Scale:  0/10  Dizziness characteristics:  Spinning inside head only   Associated symptoms: tinnitus, nausea and vomiting (resolved).no hearing loss, no fever, no headaches, no syncope, no palpitations, no slurred speech and no chest pain.  Aggravated by:  Position changes  Treatments tried:  Valium and meclizine  Improvements on treatment:  Moderate   PMH includes: MRI head and CT head.no head trauma, no ear trauma and no head trauma.  Review of Systems   Constitutional:  Negative for fever and unexpected weight change.   HENT:  Positive for tinnitus. Negative for hearing loss.    Eyes:  Negative for visual disturbance.   Cardiovascular:  Negative for chest pain, palpitations and syncope.   Gastrointestinal:  Positive for nausea and vomiting (resolved).   Neurological:  Positive for dizziness. Negative for headaches.     Objective:      Vitals:    05/08/23 1457   BP: 130/72   BP Location: Right arm   Patient Position: Sitting   BP Method: Medium (Manual)   Pulse: 76   Resp: 18   Temp: 97.7 °F (36.5 °C)   TempSrc: Temporal   SpO2: 96%   Weight: 105.3 kg (232 lb 4.1 oz)   Height: 5' 7" (1.702 m)     Physical Exam  Vitals and nursing note reviewed.   Constitutional:       General: He is not in acute distress.     Appearance: Normal appearance. He is well-developed.   HENT:      Head: Normocephalic and atraumatic.      Right Ear: Tympanic membrane normal.      Left Ear: Tympanic membrane normal.   Eyes:      Extraocular Movements: Extraocular movements intact.      " Pupils: Pupils are equal, round, and reactive to light.   Neck:      Vascular: No carotid bruit.     Cardiovascular:      Rate and Rhythm: Normal rate and regular rhythm.      Heart sounds: Normal heart sounds.   Pulmonary:      Effort: Pulmonary effort is normal.      Breath sounds: Normal breath sounds.   Musculoskeletal:      Right lower leg: No edema.      Left lower leg: No edema.   Skin:     General: Skin is warm and dry.   Neurological:      Mental Status: He is alert and oriented to person, place, and time.   Psychiatric:         Mood and Affect: Mood normal.         Behavior: Behavior normal.       Lab Results   Component Value Date    WBC 9.58 05/01/2023    HGB 13.3 (L) 05/01/2023    HCT 42.7 05/01/2023     05/01/2023    CHOL 186 04/25/2023    TRIG 94 04/25/2023    HDL 38 (L) 04/25/2023    ALT 21 05/01/2023    AST 26 05/01/2023     05/01/2023    K 4.0 05/01/2023     05/01/2023    CREATININE 0.9 05/01/2023    BUN 9 05/01/2023    CO2 21 (L) 05/01/2023    TSH 0.791 04/25/2023      Assessment:       1. Vertigo    2. Localized swelling, mass or lump of neck        Plan:       Vertigo  -     meclizine (ANTIVERT) 25 mg tablet; Take 1 tablet (25 mg total) by mouth 3 (three) times daily as needed for Dizziness.  Dispense: 30 tablet; Refill: 1  Continue symptomatic treatment for now. Tentatively cleared to return to work on 5/15  Localized swelling, mass or lump of neck  F/u with ENT as scheduled    Medication List with Changes/Refills   Current Medications    DIAZEPAM (VALIUM) 2 MG TABLET    Take 1 tablet (2 mg total) by mouth every 6 (six) hours as needed (dizziness).    ONDANSETRON (ZOFRAN-ODT) 4 MG TBDL    Take 1 tablet (4 mg total) by mouth every 6 (six) hours as needed (Nausea).   Changed and/or Refilled Medications    Modified Medication Previous Medication    MECLIZINE (ANTIVERT) 25 MG TABLET meclizine (ANTIVERT) 25 mg tablet       Take 1 tablet (25 mg total) by mouth 3 (three) times daily  as needed for Dizziness.    Take 1 tablet (25 mg total) by mouth 3 (three) times daily as needed.   Discontinued Medications    NAPROXEN (NAPROSYN) 500 MG TABLET    Take 1 tablet (500 mg total) by mouth 2 (two) times daily with meals.

## 2023-05-15 ENCOUNTER — PATIENT MESSAGE (OUTPATIENT)
Dept: PRIMARY CARE CLINIC | Facility: CLINIC | Age: 33
End: 2023-05-15
Payer: COMMERCIAL

## 2023-05-17 ENCOUNTER — PATIENT MESSAGE (OUTPATIENT)
Dept: PRIMARY CARE CLINIC | Facility: CLINIC | Age: 33
End: 2023-05-17

## 2023-05-17 ENCOUNTER — OFFICE VISIT (OUTPATIENT)
Dept: PRIMARY CARE CLINIC | Facility: CLINIC | Age: 33
End: 2023-05-17
Payer: COMMERCIAL

## 2023-05-17 DIAGNOSIS — R42 VERTIGO: Primary | ICD-10-CM

## 2023-05-17 PROCEDURE — 1160F RVW MEDS BY RX/DR IN RCRD: CPT | Mod: CPTII,95,, | Performed by: FAMILY MEDICINE

## 2023-05-17 PROCEDURE — 1159F MED LIST DOCD IN RCRD: CPT | Mod: CPTII,95,, | Performed by: FAMILY MEDICINE

## 2023-05-17 PROCEDURE — 99213 OFFICE O/P EST LOW 20 MIN: CPT | Mod: 95,,, | Performed by: FAMILY MEDICINE

## 2023-05-17 PROCEDURE — 1159F PR MEDICATION LIST DOCUMENTED IN MEDICAL RECORD: ICD-10-PCS | Mod: CPTII,95,, | Performed by: FAMILY MEDICINE

## 2023-05-17 PROCEDURE — 99213 PR OFFICE/OUTPT VISIT, EST, LEVL III, 20-29 MIN: ICD-10-PCS | Mod: 95,,, | Performed by: FAMILY MEDICINE

## 2023-05-17 PROCEDURE — 1160F PR REVIEW ALL MEDS BY PRESCRIBER/CLIN PHARMACIST DOCUMENTED: ICD-10-PCS | Mod: CPTII,95,, | Performed by: FAMILY MEDICINE

## 2023-05-17 NOTE — PROGRESS NOTES
Subjective:       Patient ID: Yash Miguel is a 32 y.o. male.    Chief Complaint: No chief complaint on file.      Has been out of work since 04/29 with bout of vertigo.  Slowly improving, but still has some residual symptoms, unable to return to work as a  until his dizziness has completely resolved.  He does not have any available light or sedentary duty at his current job.  Still feels a little off balance at times, was able to walk 2 miles, though.  Has chronic tinnitus in his left ear.    The patient location is:  [place]  The chief complaint leading to consultation is:  Vertigo    Visit type: audiovisual    Face to Face time with patient:  8 minutes  17 minutes of total time spent on the encounter, which includes face to face time and non-face to face time preparing to see the patient (eg, review of tests), Obtaining and/or reviewing separately obtained history, Documenting clinical information in the electronic or other health record, Independently interpreting results (not separately reported) and communicating results to the patient/family/caregiver, or Care coordination (not separately reported).         Each patient to whom he or she provides medical services by telemedicine is:  (1) informed of the relationship between the physician and patient and the respective role of any other health care provider with respect to management of the patient; and (2) notified that he or she may decline to receive medical services by telemedicine and may withdraw from such care at any time.    Notes:    Review of Systems   HENT:  Positive for tinnitus.    Respiratory:  Negative for shortness of breath.    Cardiovascular:  Negative for chest pain.   Gastrointestinal:  Negative for nausea and vomiting.   Neurological:  Positive for dizziness.     Objective:      There were no vitals filed for this visit.  Physical Exam  Constitutional:       General: He is not in acute distress.     Appearance: Normal  appearance. He is well-developed.   Pulmonary:      Effort: No respiratory distress.   Neurological:      Mental Status: He is alert and oriented to person, place, and time.   Psychiatric:         Behavior: Behavior normal.       Lab Results   Component Value Date    WBC 9.58 05/01/2023    HGB 13.3 (L) 05/01/2023    HCT 42.7 05/01/2023     05/01/2023    CHOL 186 04/25/2023    TRIG 94 04/25/2023    HDL 38 (L) 04/25/2023    ALT 21 05/01/2023    AST 26 05/01/2023     05/01/2023    K 4.0 05/01/2023     05/01/2023    CREATININE 0.9 05/01/2023    BUN 9 05/01/2023    CO2 21 (L) 05/01/2023    TSH 0.791 04/25/2023      Assessment:       1. Vertigo        Plan:       Vertigo  Will complete [facility] paperwork.  Cleared to return to work without restrictions on 05/23/2023    Medication List with Changes/Refills   Current Medications    DIAZEPAM (VALIUM) 2 MG TABLET    Take 1 tablet (2 mg total) by mouth every 6 (six) hours as needed (dizziness).    MECLIZINE (ANTIVERT) 25 MG TABLET    Take 1 tablet (25 mg total) by mouth 3 (three) times daily as needed for Dizziness.    ONDANSETRON (ZOFRAN-ODT) 4 MG TBDL    Take 1 tablet (4 mg total) by mouth every 6 (six) hours as needed (Nausea).

## 2023-05-17 NOTE — LETTER
May 17, 2023    Yash Miguel  [address] [address]             [facility] [address], [facility] [address] [place] [address]  Phone: [phone]  Fax: [phone] To whom it may concern:    Please excuse Mr. Miguel's continued absence from work.  He is medically cleared to return to work without restrictions on Tuesday, 05/23/2023.  Please contact my office if you have any questions or concerns.      Respectfully,    Manuel Santacruz MD

## 2023-05-19 ENCOUNTER — OFFICE VISIT (OUTPATIENT)
Dept: OTOLARYNGOLOGY | Facility: CLINIC | Age: 33
End: 2023-05-19
Payer: COMMERCIAL

## 2023-05-19 VITALS
HEART RATE: 93 BPM | WEIGHT: 235.69 LBS | DIASTOLIC BLOOD PRESSURE: 68 MMHG | SYSTOLIC BLOOD PRESSURE: 124 MMHG | BODY MASS INDEX: 36.99 KG/M2 | HEIGHT: 67 IN

## 2023-05-19 DIAGNOSIS — H81.22: ICD-10-CM

## 2023-05-19 DIAGNOSIS — R42 DIZZINESS: ICD-10-CM

## 2023-05-19 DIAGNOSIS — H91.22 SUDDEN IDIOPATHIC HEARING LOSS OF LEFT EAR WITH UNRESTRICTED HEARING OF RIGHT EAR: Primary | ICD-10-CM

## 2023-05-19 DIAGNOSIS — H61.23 CERUMINOSIS, BILATERAL: ICD-10-CM

## 2023-05-19 DIAGNOSIS — Q27.9 VENOUS LYMPHATIC MALFORMATION: ICD-10-CM

## 2023-05-19 DIAGNOSIS — H61.23 HEARING LOSS DUE TO CERUMEN IMPACTION, BILATERAL: ICD-10-CM

## 2023-05-19 PROCEDURE — 99999 PR PBB SHADOW E&M-EST. PATIENT-LVL V: CPT | Mod: PBBFAC,,, | Performed by: OTOLARYNGOLOGY

## 2023-05-19 PROCEDURE — 69210 EAR CERUMEN REMOVAL: ICD-10-PCS | Mod: S$GLB,,, | Performed by: OTOLARYNGOLOGY

## 2023-05-19 PROCEDURE — 99204 PR OFFICE/OUTPT VISIT, NEW, LEVL IV, 45-59 MIN: ICD-10-PCS | Mod: 25,S$GLB,, | Performed by: OTOLARYNGOLOGY

## 2023-05-19 PROCEDURE — 3008F BODY MASS INDEX DOCD: CPT | Mod: CPTII,S$GLB,, | Performed by: OTOLARYNGOLOGY

## 2023-05-19 PROCEDURE — 3008F PR BODY MASS INDEX (BMI) DOCUMENTED: ICD-10-PCS | Mod: CPTII,S$GLB,, | Performed by: OTOLARYNGOLOGY

## 2023-05-19 PROCEDURE — 3078F PR MOST RECENT DIASTOLIC BLOOD PRESSURE < 80 MM HG: ICD-10-PCS | Mod: CPTII,S$GLB,, | Performed by: OTOLARYNGOLOGY

## 2023-05-19 PROCEDURE — 99999 PR PBB SHADOW E&M-EST. PATIENT-LVL V: ICD-10-PCS | Mod: PBBFAC,,, | Performed by: OTOLARYNGOLOGY

## 2023-05-19 PROCEDURE — 3078F DIAST BP <80 MM HG: CPT | Mod: CPTII,S$GLB,, | Performed by: OTOLARYNGOLOGY

## 2023-05-19 PROCEDURE — 3074F PR MOST RECENT SYSTOLIC BLOOD PRESSURE < 130 MM HG: ICD-10-PCS | Mod: CPTII,S$GLB,, | Performed by: OTOLARYNGOLOGY

## 2023-05-19 PROCEDURE — 1159F PR MEDICATION LIST DOCUMENTED IN MEDICAL RECORD: ICD-10-PCS | Mod: CPTII,S$GLB,, | Performed by: OTOLARYNGOLOGY

## 2023-05-19 PROCEDURE — 69210 REMOVE IMPACTED EAR WAX UNI: CPT | Mod: S$GLB,,, | Performed by: OTOLARYNGOLOGY

## 2023-05-19 PROCEDURE — 3074F SYST BP LT 130 MM HG: CPT | Mod: CPTII,S$GLB,, | Performed by: OTOLARYNGOLOGY

## 2023-05-19 PROCEDURE — 1159F MED LIST DOCD IN RCRD: CPT | Mod: CPTII,S$GLB,, | Performed by: OTOLARYNGOLOGY

## 2023-05-19 PROCEDURE — 99204 OFFICE O/P NEW MOD 45 MIN: CPT | Mod: 25,S$GLB,, | Performed by: OTOLARYNGOLOGY

## 2023-05-19 RX ORDER — PREDNISONE 10 MG/1
TABLET ORAL
Qty: 83 TABLET | Refills: 0 | Status: SHIPPED | OUTPATIENT
Start: 2023-05-19 | End: 2023-05-31 | Stop reason: SINTOL

## 2023-05-19 NOTE — PROCEDURES
"Ear Cerumen Removal    Date/Time: 5/19/2023 1:00 PM  Performed by: Elmo Morillo MD  Authorized by: Elmo Morillo MD     Time out: Immediately prior to procedure a "time out" was called to verify the correct patient, procedure, equipment, support staff and site/side marked as required.    Consent Done?:  Yes (Verbal)    Local anesthetic:  None  Location details:  Both ears  Procedure type: curette    Cerumen  Removal Results:  Cerumen completely removed  Patient tolerance:  Patient tolerated the procedure well with no immediate complications     See note for details.   "

## 2023-05-19 NOTE — PATIENT INSTRUCTIONS
Take the prednisone as prescribed 60 mg divided up throughout the day as his best tolerated for 10 full days then weaning down as prescribed for sudden left-sided hearing loss.  Hearing test as soon as possible no later than early next week.  Intratympanic steroids (injection of steroids into the ear) may be appropriate depending upon the findings of the hearing testing and response to oral steroids.      Wean off of the meclizine/Antivert and Valium so as to better rehabilitate inner ear dysfunction from the acute vestibular event.    Complete an MRI of the brain and neck prior to follow-up.  If there is substantial hearing loss not improving with the oral steroids we will move up follow-up visit.  Otherwise follow-up in 3 weeks.  We will discuss removal of the anterior neck mass (lymphatic malformation versus vascular malformation to be determined on MRI) at that time.      Patient should be able to return to work on Monday as a  as long as he can tolerate weaning off of meclizine and not taking any Valium while working.  Both these medicines need to be weaned off in order to better rehabilitate the ear.

## 2023-05-19 NOTE — PROGRESS NOTES
[facility]    Subjective:      Patient: Yash Miguel Patient PCP: Mega Matson MD         :  1990     Sex:  male      MRN:  [id number]          Date of Visit: 2023      Chief Complaint: No chief complaint on file.      Patient ID: Yash Miguel is a 32 y.o. male lifelong NON-smoker with no medical history of migraine, ear disease or smoking referred to me by Dr. Mega Matson in consultation for evaluation of 2 complaints (1) neck mass and (2) vertigo.  Patient had excision of left neck mass in  when he was a teenager.  This is slowly been increasing periods of time tender.    Vertigo began suddenly progressively over a day or so then waking on his own he experienced prolonged vertigo worsened by any head or body movements.  He has been taking meclizine and Valium to suppress.  No audiogram.  He does feel a fullness and hearing loss in the left ear which he is attributed to excessive wax.  He is a  and has not been able to work for these past 3 weeks.    2023 MRI brain without contrast for dizziness  No parenchymal signal abnormality. No stroke or acute intracranial hemorrhage. Partially empty sella and loss of the normal flow void in the left sigmoid to jugular vein suggestive of slow flow.  Findings are nonspecific but can be associated with idiopathic intracranial hypotension in young patients.  Suggest clinical correlation.      Images reviewed limited by lack of contrast and high-resolution show no appreciable mass of the IAC or CPA.  There is a tiny medial posterior inclusion cyst in the right maxillary sinus but no significant T to bright mucoperiosteal sinus disease let alone any fluid of the middle ear mastoid.  Limited coronal imaging shows close proximity the visible superior semicircular canals to the dura of the temporal lobe unable to exclude SSCD.    A 2023 CT head without contrast  No acute intracranial abnormality.      Images again limited  by low resolution show no appreciable bony dehiscence of the semicircular canals superiorly at the arcuate eminence.    05/08/2023 CT soft tissue neck WITHOUT contrast  Soft tissue density LEFT lower neck, predominantly subcutaneous region 5.3 x 2.5 cm with eggshell type calcifications for which ENT consultation recommended as a minimum.  Histologic evaluation may be necessary for definitive diagnosis.         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  No prior MRI neck.    04/25/2023 ultrasound tissue head and neck thyroid  Evaluation LEFT neck demonstrates multiple areas with calcifications, likely calcified lymph nodes 0.8 x 0.6 x 0.3 cm, 0.7 x 0.5 x 0.6 cm, 0.7 x 0.5 x 0.8 cm, 1.0 x 0.5 x 1.0 cm and 1.6 x 0.6 x 1.6 cm.  By patient report, this region is painful yet without interval change in size, increased vascularity noted.  The appearance is nonspecific, CT examination of the neck with without contrast recommended for further evaluation.  Granulomatous etiology not excluded.  Neoplastic lymphadenopathy with calcification not excluded.    Review of Systems     Past Medical History  He has no past medical history on file.    Family / Surgical / Social History  His family history includes No Known Problems in his father and mother.    History reviewed. No pertinent surgical history.    Social History     Tobacco Use    Smoking status: Never    Smokeless tobacco: Never   Substance and Sexual Activity    Alcohol use: No    Drug use: No    Sexual activity: Yes       Medications  He has a current medication list which includes the following prescription(s): diazepam, meclizine, and ondansetron.      Allergies  Review of patient's allergies indicates:  No Known Allergies    All medications, allergies, and past  history have been reviewed.    Objective:      Vitals:  Vitals - 1 value per visit 5/1/2023 5/8/2023 5/8/2023   SYSTOLIC 136 - 130   DIASTOLIC 70 - 72   Pulse 76 - 76   Temp 98.6 - 97.7   Resp 16 - 18   SPO2 98 - 96   Weight (lb) - - 232.25   Weight (kg) - - 105.35   Height - - 67   BMI (Calculated) - - 36.4   VISIT REPORT - - -   Pain Score  - 0 -       There is no height or weight on file to calculate BSA.    Physical Exam:    GENERAL  APPEARANCE -  alert, appears stated age, cooperative, and moderately obese  BARRIER(S) TO COMMUNICATION -  none VOICE - appropriate for age and gender    INTEGUMENTARY  no suspicious head and neck lesions    HEENT  HEAD: Normocephalic, without obvious abnormality, atraumatic  FACE: INSPECTION - Symmetric, no signs of trauma, no suspicious lesion(s)  PALPATION -  No masses SALIVARY GLANDS - non-tender with no appreciable mass  STRENGTH - facial symmetry  NECK/THYROID:  Localized fullness lateral to the thyroid cartilage in the anterior to central neck involving the subcutaneous tissues minimally tender with no skin changes.  Scarring horizontally of approximately 3-4 cm just posterior to this location.  Palpable small pellets of firmness inside of the mass limited by some tenderness.    EYES  Normal occular alignment and mobility with no visible nystagmus at rest    EARS/NOSE/MOUTH/THROAT  EARS  PINNAE AND EXTERNAL EARS - no suspicious lesion OTOSCOPIC EXAM (surgical microscopy was used for visualization/instrumentation): EAR EXAM - wax impactions cleared bilaterally with suction only revealing normal ear canals tympanic membranes and middle ear spaces without effusion or retraction.  HEARING - grossly intact to voice/finger rub    NOSE AND SINUSES  EXTERNAL NOSE - Grossly normal for age/sex  SEPTUM - normal/no obstruction on anterior exam without decongestion TURBINATES - within normal limits MUCOSA - within normal limits     MOUTH AND THROAT   ORAL CAVITY, LIPS, TEETH, GUMS &  TONGUE - moist, no suspicious lesions  OROPHARYNX /TONSILS/PHARYNGEAL WALLS/HYPOPHARYNX - no erythema or exudates  NASOPHARYNX - limited mirror exam - unable to visualize due to anatomy/gag  LARYNX -  - limited mirror exam - unable to visualize due to anatomy/gag      HEAD THRUSTS - Catch up saccades catchup saccades on left head thrusts NOT on right   FUKUDA - normal/midline  RHOMBERG - Tandem (No) - normal  GAIT - 3. Normal: Pivot turns safely within 3 seconds and stops quickly with no loss of balance    CHEST AND LUNG   INSPECTION & AUSCULTATION - normal effort, no stridor    CARDIOVASCULAR  AUSCULTATION & PERIPHERAL VASCULAR - regular rate and rhythm.    NEUROLOGIC  MENTAL STATUS - alert, interactive CRANIAL NERVES - normal    LYMPHATIC  HEAD AND NECK - non-palpable      Procedure(s):  Cerumen removal performed.  See procedure note.    Labs:  WBC   Date Value Ref Range Status   05/01/2023 9.58 3.90 - 12.70 K/uL Final     Hemoglobin   Date Value Ref Range Status   05/01/2023 13.3 (L) 14.0 - 18.0 g/dL Final     Platelets   Date Value Ref Range Status   05/01/2023 375 150 - 450 K/uL Final     Creatinine   Date Value Ref Range Status   05/01/2023 0.9 0.5 - 1.4 mg/dL Final     TSH   Date Value Ref Range Status   04/25/2023 0.791 0.400 - 4.000 uIU/mL Final     Glucose   Date Value Ref Range Status   05/01/2023 123 (H) 70 - 110 mg/dL Final         Assessment:      Problem List Items Addressed This Visit    None  Visit Diagnoses       Sudden idiopathic hearing loss of left ear with unrestricted hearing of right ear    -  Primary    Venous lymphatic malformation        Hearing loss due to cerumen impaction, bilateral        Acute vestibular neuronitis of left ear        Ceruminosis, bilateral                     Plan:      Take the prednisone as prescribed 60 mg divided up throughout the day as his best tolerated for 10 full days then weaning down as prescribed for sudden left-sided hearing loss.  Hearing test as soon as  possible no later than early next week.  Intratympanic steroids (injection of steroids into the ear) may be appropriate depending upon the findings of the hearing testing and response to oral steroids.      Wean off of the meclizine/Antivert and Valium so as to better rehabilitate inner ear dysfunction from the acute vestibular event.    Complete an MRI of the brain and neck prior to follow-up.  If there is substantial hearing loss not improving with the oral steroids we will move up follow-up visit.  Otherwise follow-up in 3 weeks.  We will discuss removal of the anterior neck mass (lymphatic malformation versus vascular malformation to be determined on MRI) at that time.      Patient should be able to return to work on Monday as a  as long as he can tolerate weaning off of meclizine and not taking any Valium while working.  Both these medicines need to be weaned off in order to better rehabilitate the ear.

## 2023-05-31 ENCOUNTER — OFFICE VISIT (OUTPATIENT)
Dept: NEUROLOGY | Facility: CLINIC | Age: 33
End: 2023-05-31
Payer: COMMERCIAL

## 2023-05-31 VITALS
SYSTOLIC BLOOD PRESSURE: 121 MMHG | HEIGHT: 68 IN | DIASTOLIC BLOOD PRESSURE: 62 MMHG | BODY MASS INDEX: 35.74 KG/M2 | HEART RATE: 81 BPM | WEIGHT: 235.81 LBS

## 2023-05-31 DIAGNOSIS — G50.1 ATYPICAL FACIAL PAIN: ICD-10-CM

## 2023-05-31 DIAGNOSIS — G43.701 CHRONIC MIGRAINE WITHOUT AURA WITH STATUS MIGRAINOSUS, NOT INTRACTABLE: Primary | ICD-10-CM

## 2023-05-31 DIAGNOSIS — E23.6 EMPTY SELLA: ICD-10-CM

## 2023-05-31 PROCEDURE — 3008F BODY MASS INDEX DOCD: CPT | Mod: CPTII,S$GLB,,

## 2023-05-31 PROCEDURE — 1159F PR MEDICATION LIST DOCUMENTED IN MEDICAL RECORD: ICD-10-PCS | Mod: CPTII,S$GLB,,

## 2023-05-31 PROCEDURE — 99999 PR PBB SHADOW E&M-EST. PATIENT-LVL III: CPT | Mod: PBBFAC,,,

## 2023-05-31 PROCEDURE — 3074F PR MOST RECENT SYSTOLIC BLOOD PRESSURE < 130 MM HG: ICD-10-PCS | Mod: CPTII,S$GLB,,

## 2023-05-31 PROCEDURE — 99999 PR PBB SHADOW E&M-EST. PATIENT-LVL III: ICD-10-PCS | Mod: PBBFAC,,,

## 2023-05-31 PROCEDURE — 99205 PR OFFICE/OUTPT VISIT, NEW, LEVL V, 60-74 MIN: ICD-10-PCS | Mod: S$GLB,,,

## 2023-05-31 PROCEDURE — 1160F RVW MEDS BY RX/DR IN RCRD: CPT | Mod: CPTII,S$GLB,,

## 2023-05-31 PROCEDURE — 1160F PR REVIEW ALL MEDS BY PRESCRIBER/CLIN PHARMACIST DOCUMENTED: ICD-10-PCS | Mod: CPTII,S$GLB,,

## 2023-05-31 PROCEDURE — 3008F PR BODY MASS INDEX (BMI) DOCUMENTED: ICD-10-PCS | Mod: CPTII,S$GLB,,

## 2023-05-31 PROCEDURE — 99205 OFFICE O/P NEW HI 60 MIN: CPT | Mod: S$GLB,,,

## 2023-05-31 PROCEDURE — 3078F DIAST BP <80 MM HG: CPT | Mod: CPTII,S$GLB,,

## 2023-05-31 PROCEDURE — 3074F SYST BP LT 130 MM HG: CPT | Mod: CPTII,S$GLB,,

## 2023-05-31 PROCEDURE — 3078F PR MOST RECENT DIASTOLIC BLOOD PRESSURE < 80 MM HG: ICD-10-PCS | Mod: CPTII,S$GLB,,

## 2023-05-31 PROCEDURE — 1159F MED LIST DOCD IN RCRD: CPT | Mod: CPTII,S$GLB,,

## 2023-05-31 RX ORDER — GABAPENTIN 100 MG/1
100 CAPSULE ORAL 3 TIMES DAILY
Qty: 90 CAPSULE | Refills: 11 | Status: SHIPPED | OUTPATIENT
Start: 2023-05-31 | End: 2024-05-30

## 2023-05-31 RX ORDER — DIPHENHYDRAMINE HCL 25 MG
25 TABLET ORAL 3 TIMES DAILY
Qty: 30 TABLET | Refills: 10 | Status: SHIPPED | OUTPATIENT
Start: 2023-05-31 | End: 2023-06-09

## 2023-05-31 RX ORDER — PROCHLORPERAZINE MALEATE 10 MG
10 TABLET ORAL 3 TIMES DAILY
Qty: 30 TABLET | Refills: 10 | Status: SHIPPED | OUTPATIENT
Start: 2023-05-31

## 2023-05-31 NOTE — PROGRESS NOTES
[facility] - NEUROLOGY [address]TH [address] [facility], [place]    Date: 5/31/23  Patient Name: Yash Miguel   MRN: [id number]   Referring Provider: Arianna Jackson P*    Thank you so much Arianna Jackson P* for your patient referral to Neuromuscular team at [facility]. We take pride in our care coordination and look forward to your feedback and questions.    Assessment:   Yash Miguel is a 33 y.o. male is a very pleasant patient for evaluation of atypical facial pain and dizziness.  Due to prolonged nature of dizziness episode, I feel his dizziness could be related with migraines and as his current episode has lasted almost 3 days so there is possibility of status migrainosus.  I discussed with him in detail about my assessment and way forward.  I will try Compazine and Benadryl regularly for 3 days followed by as needed for migraines.  I additionally started him on maintenance treatment for migraines.  If it did not improve then I may proceed with IV medication for status migrainosus and occipital nerve block can be tried as well.    I discussed about fall precautions and need for Physiotherapy. I addressed his complaints. I provided information about fall precautions and healthy lifestyle.    I would wish him very best for improvement/recovery in his condition.    Future direction based on feedback:  Vestibular nystagmography can be planned future  MRI brain with orbit with and without contrast can be considered in future  Plan:     Problem List Items Addressed This Visit    None  Visit Diagnoses       Chronic migraine without aura with status migrainosus, not intractable    -  Primary    Relevant Medications    prochlorperazine (COMPAZINE) 10 MG tablet    diphenhydrAMINE (BENADRYL ALLERGY) 25 mg tablet    gabapentin (NEURONTIN) 100 MG capsule    Empty sella        Atypical facial pain                Savannah Ng MD    This evaluation was completed in > 60  Minutes over 50% of the time spent on education & counseling. This time includes pre-charting/chart review including labs and imaging, face to face encounter followed by documentation/note writing.      Patient note was created using MModal Dictation.  Any errors in syntax or even information may not have been identified and edited on initial review prior to signing this note.    Details provided by:    Patient      HPI:    Chief complaint today:  Left-sided atypical facial pain    Mr. Yash Miguel is a 33 y.o. male who noted left-sided facial pain which radiates from frontal to the ear area for last couple of days.  He explains it as a sharp severe pain which varies in intensity and currently it is 5/10 in intensity.  He mentioned history of recurrent headaches and his usual frequency is 3-4 times per week.  He noted this facial pain for more than 3 years which is intermittent in nature.  He feels this pain is relieved by caffeine and Aleve.  He noted burning sensation in eyes with sensitivity to light and sound.  There is no nausea but he did notice some numbness of left side of the face. He tried prednisone Dosepak in the past but because of this he was not able to sleep so he stopped it.  He has used butalbital in the past.  He is not on any maintenance treatment for his migraines.  There is no family history of migraines.      He feels his vertigo is improved but pending especially on the left side worsened the dizziness.  He has otolaryngology evaluation and impacted wax was removed on 05/19/2023.      There is no history of smoking or alcohol use.      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      Chart review:  5/19/23  Yash Miguel is a 32 y.o. male lifelong NON-smoker with no medical history of migraine, ear disease or smoking referred to me by Dr. Mega Matson in consultation for evaluation of 2 complaints (1) neck mass and (2) vertigo.  Patient  had excision of left neck mass in 2006 when he was a teenager.  This is slowly been increasing periods of time tender. Vertigo began suddenly progressively over a day or so then waking on his own he experienced prolonged vertigo worsened by any head or body movements.  He has been taking meclizine and Valium to suppress.  No audiogram.  He does feel a fullness and hearing loss in the left ear which he is attributed to excessive wax.      Pertinent work up based on chart review for current condition:  05/01/2023 MRI brain without contrast for dizziness  No parenchymal signal abnormality. No stroke or acute intracranial hemorrhage. Partially empty sella and loss of the normal flow void in the left sigmoid to jugular vein suggestive of slow flow.  Findings are nonspecific but can be associated with idiopathic intracranial hypotension in young patients.  Suggest clinical correlation.    05/08/2023 CT soft tissue neck WITHOUT contrast  Soft tissue density LEFT lower neck, predominantly subcutaneous region 5.3 x 2.5 cm with eggshell type calcifications for which ENT consultation recommended as a minimum.  Histologic evaluation may be necessary for definitive diagnosis.    A 04/29/2023 CT head without contrast  No acute intracranial abnormality.    04/25/2023 ultrasound tissue head and neck thyroid  Evaluation LEFT neck demonstrates multiple areas with calcifications, likely calcified lymph nodes 0.8 x 0.6 x 0.3 cm, 0.7 x 0.5 x 0.6 cm, 0.7 x 0.5 x 0.8 cm, 1.0 x 0.5 x 1.0 cm and 1.6 x 0.6 x 1.6 cm.  By patient report, this region is painful yet without interval change in size, increased vascularity noted.  The appearance is nonspecific, CT examination of the neck with without contrast recommended for further evaluation.  Granulomatous etiology not excluded.  Neoplastic lymphadenopathy with calcification not excluded.    HIV antibody testing negative   TSH 0.7 normal    slightly higher than normal 4 months ago. Can be  repeated in future  SARs COVID 19 qualitative PCR positive 1 year ago.    ECG with normal sinus rhythm normal     Last CBC Results:   Lab Results   Component Value Date    WBC 9.58 05/01/2023    HGB 13.3 (L) 05/01/2023    HCT 42.7 05/01/2023     05/01/2023       Last CMP Results  Lab Results   Component Value Date     05/01/2023    K 4.0 05/01/2023     05/01/2023    CO2 21 (L) 05/01/2023    BUN 9 05/01/2023    CREATININE 0.9 05/01/2023    CALCIUM 10.1 05/01/2023    ALBUMIN 4.3 05/01/2023    AST 26 05/01/2023    ALT 21 05/01/2023       Last Lipids  Lab Results   Component Value Date    CHOL 186 04/25/2023    TRIG 94 04/25/2023    HDL 38 (L) 04/25/2023    LDLCALC 129.2 04/25/2023       Last A1C  No results found for: HGBA1C    Last TSH  Lab Results   Component Value Date    TSH 0.791 04/25/2023       Review of Systems:  12 system review of systems is negative except for the symptoms mentioned in HPI.       PAST MEDICAL HISTORY:  No past medical history on file.    PAST SURGICAL HISTORY:  No past surgical history on file.    CURRENT MEDS:  I have reconciled the patient's home medications and discharge medications with the patient/family. I have updated all changes.  Refer to After-Visit Medication List.    Current Outpatient Medications   Medication Sig Dispense Refill    diazePAM (VALIUM) 2 MG tablet Take 1 tablet (2 mg total) by mouth every 6 (six) hours as needed (dizziness). 15 tablet 0    meclizine (ANTIVERT) 25 mg tablet Take 1 tablet (25 mg total) by mouth 3 (three) times daily as needed for Dizziness. 30 tablet 1    ondansetron (ZOFRAN-ODT) 4 MG TbDL Take 1 tablet (4 mg total) by mouth every 6 (six) hours as needed (Nausea). (Patient not taking: Reported on 5/19/2023) 15 tablet 0    predniSONE (DELTASONE) 10 MG tablet 20 mg TID for 10 days, then 20 mg BID for 3 days, then 20 mg daily for 3 days, then 10 mg daily for 3 days, then 5 mg daily for 3 days 83 tablet 0     No current  "facility-administered medications for this visit.       ALLERGIES:  Review of patient's allergies indicates:  No Known Allergies    FAMILY HISTORY:  Family History   Problem Relation Age of Onset    No Known Problems Mother     No Known Problems Father        SOCIAL HISTORY:  Social History     Tobacco Use    Smoking status: Never    Smokeless tobacco: Never   Substance Use Topics    Alcohol use: No    Drug use: No         Objective:     Vitals:    05/31/23 0802   Weight: 106.9 kg (235 lb 12.5 oz)   Height: 5' 8" (1.727 m)     Wt Readings from Last 3 Encounters:   05/31/23 0802 106.9 kg (235 lb 12.5 oz)   05/19/23 1309 106.9 kg (235 lb 10.8 oz)   05/08/23 1457 105.3 kg (232 lb 4.1 oz)     Body mass index is 35.85 kg/m².       Eyes: no tearing, discharge, no erythema   ENT: moist mucous membranes of the oral cavity, nares patent    Neck: Supple, full range of motion  Cardiovascular: Warm and well perfused, pulses equal and symmetrical  Lungs: Normal work of breathing, normal chest wall excursions  Skin: No rash, lesions, or breakdown on exposed skin  Psychiatry: Mood and affect are appropriate   Extremeties: No cyanosis, clubbing or edema.    GENERAL/CONSTITUTIONAL:    -Well appearing; well nourished  -There is evidence of scalp and occipital tenderness on palpation.      HIGHER INTEGRATIVE FUNCTIONS:   -Attention & concentration: Normal   -Orientation: Oriented to person, place & time  -Memory: Normal  -Language: Normal   -Fund of Knowledge: Normal     FUNDOSCOPY:  He feels sensitive to light so fundoscopy was not attempted.    CRANIAL NERVES:   -CN 2: Visual fields full  -CN 2,3: PERRL  -CN 3,4,6: EOMI. There is pain with leftward and upward eye movements.    -CN 5: Facial sensation intact bilaterally  -CN 7: Facial strength/movement intact bilaterally  -CN 8: Hearing normal bilaterally  -CN 9,10: Palate elevates symmetrically  -CN 11: Normal shoulder shrug and head turn  -CN 12: Tongue protrudes midline     MOTOR: "   -Tone: normal in upper and lower extremities  -UE/LE motor: 5/5 throughout, no pronator drift     SENSATION:   -Intact bilaterally to Vibration and touch    REFLEXES:   -2/4 upper and lower extremities bilaterally  -Flexor plantar reflex bilaterally    COORDINATION:   -FNF normal bilaterally    GAIT:   -Normal casual gait.     Scheduled Follow-up :  Future Appointments   Date Time Provider Department Center   6/9/2023  8:00 AM Elmo Morillo MD [facility]       After Visit Medication List :     Medication List            Accurate as of May 31, 2023  8:05 AM. If you have any questions, ask your nurse or doctor.                CONTINUE taking these medications      diazePAM 2 MG tablet  Commonly known as: VALIUM  Take 1 tablet (2 mg total) by mouth every 6 (six) hours as needed (dizziness).     meclizine 25 mg tablet  Commonly known as: ANTIVERT  Take 1 tablet (25 mg total) by mouth 3 (three) times daily as needed for Dizziness.     ondansetron 4 MG Tbdl  Commonly known as: ZOFRAN-ODT  Take 1 tablet (4 mg total) by mouth every 6 (six) hours as needed (Nausea).     predniSONE 10 MG tablet  Commonly known as: DELTASONE  20 mg TID for 10 days, then 20 mg BID for 3 days, then 20 mg daily for 3 days, then 10 mg daily for 3 days, then 5 mg daily for 3 days              Signing Physician:              Savannah Ng MD

## 2023-05-31 NOTE — PATIENT INSTRUCTIONS
Headache log  Walk and exercise  Update me after one week  Occipital nerve block and IV meds can be considered in future

## 2023-06-01 ENCOUNTER — PATIENT MESSAGE (OUTPATIENT)
Dept: PRIMARY CARE CLINIC | Facility: CLINIC | Age: 33
End: 2023-06-01
Payer: COMMERCIAL

## 2023-06-01 ENCOUNTER — TELEPHONE (OUTPATIENT)
Dept: PRIMARY CARE CLINIC | Facility: CLINIC | Age: 33
End: 2023-06-01
Payer: COMMERCIAL

## 2023-06-01 NOTE — TELEPHONE ENCOUNTER
----- Message from Thien Anderson sent at 6/1/2023  2:52 PM CDT -----  Contact: self [phone]  Patient is returning a phone call.  Who left a message for the patient: Michelle Anderson MA  Does patient know what this is regarding:    Would you like a call back, or a response through your [facility] portal?:   call back  Comments:     Please call and advise

## 2023-06-02 ENCOUNTER — PATIENT MESSAGE (OUTPATIENT)
Dept: PRIMARY CARE CLINIC | Facility: CLINIC | Age: 33
End: 2023-06-02
Payer: COMMERCIAL

## 2023-06-08 ENCOUNTER — PATIENT MESSAGE (OUTPATIENT)
Dept: PRIMARY CARE CLINIC | Facility: CLINIC | Age: 33
End: 2023-06-08
Payer: COMMERCIAL

## 2023-06-09 ENCOUNTER — TELEPHONE (OUTPATIENT)
Dept: NEUROLOGY | Facility: CLINIC | Age: 33
End: 2023-06-09

## 2023-06-09 ENCOUNTER — OFFICE VISIT (OUTPATIENT)
Dept: OTOLARYNGOLOGY | Facility: CLINIC | Age: 33
End: 2023-06-09
Payer: COMMERCIAL

## 2023-06-09 VITALS
BODY MASS INDEX: 35.31 KG/M2 | SYSTOLIC BLOOD PRESSURE: 127 MMHG | WEIGHT: 233 LBS | HEART RATE: 91 BPM | HEIGHT: 68 IN | DIASTOLIC BLOOD PRESSURE: 78 MMHG

## 2023-06-09 DIAGNOSIS — G43.009 ATYPICAL MIGRAINE: ICD-10-CM

## 2023-06-09 DIAGNOSIS — H91.22 SUDDEN IDIOPATHIC HEARING LOSS OF LEFT EAR WITH UNRESTRICTED HEARING OF RIGHT EAR: Primary | ICD-10-CM

## 2023-06-09 DIAGNOSIS — Q27.9 VENOUS LYMPHATIC MALFORMATION: ICD-10-CM

## 2023-06-09 DIAGNOSIS — H81.22: ICD-10-CM

## 2023-06-09 PROCEDURE — 99214 PR OFFICE/OUTPT VISIT, EST, LEVL IV, 30-39 MIN: ICD-10-PCS | Mod: S$GLB,,, | Performed by: OTOLARYNGOLOGY

## 2023-06-09 PROCEDURE — 3078F DIAST BP <80 MM HG: CPT | Mod: CPTII,S$GLB,, | Performed by: OTOLARYNGOLOGY

## 2023-06-09 PROCEDURE — 1160F RVW MEDS BY RX/DR IN RCRD: CPT | Mod: CPTII,S$GLB,, | Performed by: OTOLARYNGOLOGY

## 2023-06-09 PROCEDURE — 3074F SYST BP LT 130 MM HG: CPT | Mod: CPTII,S$GLB,, | Performed by: OTOLARYNGOLOGY

## 2023-06-09 PROCEDURE — 99214 OFFICE O/P EST MOD 30 MIN: CPT | Mod: S$GLB,,, | Performed by: OTOLARYNGOLOGY

## 2023-06-09 PROCEDURE — 3008F PR BODY MASS INDEX (BMI) DOCUMENTED: ICD-10-PCS | Mod: CPTII,S$GLB,, | Performed by: OTOLARYNGOLOGY

## 2023-06-09 PROCEDURE — 1160F PR REVIEW ALL MEDS BY PRESCRIBER/CLIN PHARMACIST DOCUMENTED: ICD-10-PCS | Mod: CPTII,S$GLB,, | Performed by: OTOLARYNGOLOGY

## 2023-06-09 PROCEDURE — 99999 PR PBB SHADOW E&M-EST. PATIENT-LVL III: ICD-10-PCS | Mod: PBBFAC,,, | Performed by: OTOLARYNGOLOGY

## 2023-06-09 PROCEDURE — 1159F MED LIST DOCD IN RCRD: CPT | Mod: CPTII,S$GLB,, | Performed by: OTOLARYNGOLOGY

## 2023-06-09 PROCEDURE — 3078F PR MOST RECENT DIASTOLIC BLOOD PRESSURE < 80 MM HG: ICD-10-PCS | Mod: CPTII,S$GLB,, | Performed by: OTOLARYNGOLOGY

## 2023-06-09 PROCEDURE — 99999 PR PBB SHADOW E&M-EST. PATIENT-LVL III: CPT | Mod: PBBFAC,,, | Performed by: OTOLARYNGOLOGY

## 2023-06-09 PROCEDURE — 3008F BODY MASS INDEX DOCD: CPT | Mod: CPTII,S$GLB,, | Performed by: OTOLARYNGOLOGY

## 2023-06-09 PROCEDURE — 3074F PR MOST RECENT SYSTOLIC BLOOD PRESSURE < 130 MM HG: ICD-10-PCS | Mod: CPTII,S$GLB,, | Performed by: OTOLARYNGOLOGY

## 2023-06-09 PROCEDURE — 1159F PR MEDICATION LIST DOCUMENTED IN MEDICAL RECORD: ICD-10-PCS | Mod: CPTII,S$GLB,, | Performed by: OTOLARYNGOLOGY

## 2023-06-09 RX ORDER — DIPHENHYDRAMINE HYDROCHLORIDE 25 MG/1
25 CAPSULE ORAL
COMMUNITY
Start: 2023-05-31

## 2023-06-09 NOTE — TELEPHONE ENCOUNTER
----- Message from Savannah Ng MD sent at 6/9/2023  3:26 PM CDT -----  Please update the patient about coming to emergency department and getting migraine cocktail for headaches.  IV magnesium 2 g, valproic acid 500 mg, Compazine 10 mg IV, Tylenol 1 g IV, normal saline 1 L.  Thank you

## 2023-06-09 NOTE — Clinical Note
Dr. CRUZ - Please my notes regarding ongoing migraine symptoms and desire for additional imaging being coordinated.  Thanks.  - Dilan

## 2023-06-09 NOTE — PROGRESS NOTES
" [facility]    Subjective:      Patient: Yash Miguel Patient PCP: Mega Matson MD         :  1990     Sex:  male      MRN:  [id number]          Date of Visit: 2023      Chief Complaint: Other (Past week, went back to work, in sunlight Left ear sharp pain, eyes "get red" in the sunlight, pain on left temple. balance improved 90%. )    2023 follow-up patient visit:  Yash is a 33-year-old male seen in follow-up today for 2 issues.  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  Some ongoing light sensitivity and left temporal headache but marked improvement imbalance.  He is returned to his work as a .  Completed steroids as ordered for what appeared to be sudden sensorineural hearing loss associated with the vertigo which was to be confirmed by urgent audiogram and consideration of intratympanic steroids.  Surgical planning to be discussed based on MRI of the neck. Audiogram ordered not completed. MRI scans ordered not completed.    Patient completed 6 days of high-dose steroids had dramatic improvement near resolution of his vertigo and his subjective hearing loss.  He was never contacted by audiology and failed to reach out to them with the card provided.  Additional MRI scanning is not been ordered.  He was seen by Neurology as ordered by primary care provider (note reviewed from Dr. Ng).  NSAID visit he has been experiencing reddening of his left eye when he is exposed to the sun without associated pain he still has some ongoing discomfort in the left ear though he feels like his hearing has essentially normalized in his balance is almost completely normal as well.  Neck is unchanged.    Patient ID: Yash Miguel is a 32 y.o. male lifelong NON-smoker with no medical history of migraine, ear disease or smoking referred to me by Dr. Mega ESPINAL " Dano in consultation for evaluation of 2 complaints (1) neck mass and (2) vertigo.  Patient had excision of left neck mass in 2006 when he was a teenager.  This is slowly been increasing periods of time tender.    Vertigo began suddenly progressively over a day or so then waking on his own he experienced prolonged vertigo worsened by any head or body movements.  He has been taking meclizine and Valium to suppress.  No audiogram.  He does feel a fullness and hearing loss in the left ear which he is attributed to excessive wax.  He is a  and has not been able to work for these past 3 weeks.    05/01/2023 MRI brain without contrast for dizziness  No parenchymal signal abnormality. No stroke or acute intracranial hemorrhage. Partially empty sella and loss of the normal flow void in the left sigmoid to jugular vein suggestive of slow flow.  Findings are nonspecific but can be associated with idiopathic intracranial hypotension in young patients.  Suggest clinical correlation.      Images reviewed limited by lack of contrast and high-resolution show no appreciable mass of the IAC or CPA.  There is a tiny medial posterior inclusion cyst in the right maxillary sinus but no significant T to bright mucoperiosteal sinus disease let alone any fluid of the middle ear mastoid.  Limited coronal imaging shows close proximity the visible superior semicircular canals to the dura of the temporal lobe unable to exclude SSCD.    A 04/29/2023 CT head without contrast  No acute intracranial abnormality.      Images again limited by low resolution show no appreciable bony dehiscence of the semicircular canals superiorly at the arcuate eminence.    05/08/2023 CT soft tissue neck WITHOUT contrast  Soft tissue density LEFT lower neck, predominantly subcutaneous region 5.3 x 2.5 cm with eggshell type calcifications for which ENT consultation recommended as a minimum.  Histologic evaluation may be necessary for definitive  diagnosis.         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  No prior MRI neck.    04/25/2023 ultrasound tissue head and neck thyroid  Evaluation LEFT neck demonstrates multiple areas with calcifications, likely calcified lymph nodes 0.8 x 0.6 x 0.3 cm, 0.7 x 0.5 x 0.6 cm, 0.7 x 0.5 x 0.8 cm, 1.0 x 0.5 x 1.0 cm and 1.6 x 0.6 x 1.6 cm.  By patient report, this region is painful yet without interval change in size, increased vascularity noted.  The appearance is nonspecific, CT examination of the neck with without contrast recommended for further evaluation.  Granulomatous etiology not excluded.  Neoplastic lymphadenopathy with calcification not excluded.    Review of Systems     Past Medical History  He has no past medical history on file.    Family / Surgical / Social History  His family history includes No Known Problems in his father and mother.    No past surgical history on file.    Social History     Tobacco Use    Smoking status: Never    Smokeless tobacco: Never   Substance and Sexual Activity    Alcohol use: No    Drug use: No    Sexual activity: Yes       Medications  He has a current medication list which includes the following prescription(s): gabapentin, banophen, and prochlorperazine.      Allergies  Review of patient's allergies indicates:  No Known Allergies    All medications, allergies, and past history have been reviewed.    Objective:      Vitals:  Vitals - 1 value per visit 5/31/2023 6/9/2023 6/9/2023   SYSTOLIC 121 - -   DIASTOLIC 62 - -   Pulse 81 - -   Temp - - -   Resp - - -   SPO2 - - -   Weight (lb) 235.78 - 233.03   Weight (kg) 106.95 - 105.7   Height 68 - 68   BMI (Calculated) 35.9 - 35.4   VISIT REPORT - - -   Pain Score  - 5 -       Body surface area is 2.25 meters  squared.    Physical Exam:    GENERAL  APPEARANCE -  alert, appears stated age, cooperative, and moderately obese  BARRIER(S) TO COMMUNICATION -  none VOICE - appropriate for age and gender    INTEGUMENTARY  no suspicious head and neck lesions    HEENT  HEAD: Normocephalic, without obvious abnormality, atraumatic  FACE: INSPECTION - Symmetric, no signs of trauma, no suspicious lesion(s)  PALPATION -  No masses SALIVARY GLANDS - non-tender with no appreciable mass  STRENGTH - facial symmetry  NECK/THYROID:  Localized fullness lateral to the thyroid cartilage in the anterior to central neck involving the subcutaneous tissues minimally tender with no skin changes.  Scarring horizontally of approximately 3-4 cm just posterior to this location.  Palpable small pellets of firmness inside of the mass limited by some tenderness.    EYES  Normal occular alignment and mobility with no visible nystagmus at rest    EARS/NOSE/MOUTH/THROAT  EARS  PINNAE AND EXTERNAL EARS - no suspicious lesion OTOSCOPIC EXAM (surgical microscopy was used for visualization/instrumentation): EAR EXAM - normal ear canals tympanic membranes and middle ear spaces without effusion or retraction.  HEARING - grossly intact to voice/finger rub    NOSE AND SINUSES  EXTERNAL NOSE - Grossly normal for age/sex  SEPTUM - normal/no obstruction on anterior exam without decongestion TURBINATES - within normal limits MUCOSA - within normal limits     MOUTH AND THROAT   ORAL CAVITY, LIPS, TEETH, GUMS & TONGUE - moist, no suspicious lesions  OROPHARYNX /TONSILS/PHARYNGEAL WALLS/HYPOPHARYNX - no erythema or exudates  NASOPHARYNX - limited mirror exam - unable to visualize due to anatomy/gag  LARYNX -  - limited mirror exam - unable to visualize due to anatomy/gag      CHEST AND LUNG   INSPECTION & AUSCULTATION - normal effort, no stridor    CARDIOVASCULAR  AUSCULTATION & PERIPHERAL VASCULAR - regular rate and rhythm.    NEUROLOGIC  MENTAL STATUS - alert, interactive  CRANIAL NERVES - normal    LYMPHATIC  HEAD AND NECK - non-palpable      Procedure(s):  Cerumen removal performed.  See procedure note.    Labs:  WBC   Date Value Ref Range Status   05/01/2023 9.58 3.90 - 12.70 K/uL Final     Hemoglobin   Date Value Ref Range Status   05/01/2023 13.3 (L) 14.0 - 18.0 g/dL Final     Platelets   Date Value Ref Range Status   05/01/2023 375 150 - 450 K/uL Final     Creatinine   Date Value Ref Range Status   05/01/2023 0.9 0.5 - 1.4 mg/dL Final     TSH   Date Value Ref Range Status   04/25/2023 0.791 0.400 - 4.000 uIU/mL Final     Glucose   Date Value Ref Range Status   05/01/2023 123 (H) 70 - 110 mg/dL Final         Assessment:      Problem List Items Addressed This Visit    None  Visit Diagnoses       Sudden idiopathic hearing loss of left ear with unrestricted hearing of right ear    -  Primary    Venous lymphatic malformation        Acute vestibular neuronitis of left ear        Atypical migraine                       Plan:      Patient's headaches and symptoms have evolved even further since he saw Dr. Ng a week and a half ago now having reddening of the left eye and left ear pain.  He is had no pain from the eye and no drainage.  Per review of Dr. Ng's note he agreed this is likely migrainous in nature not related to any benign intracranial hypertension raises the possibility on prior brain MRI.  His plan was to treat and if not resolve to consider intravenous treatment for status migrainous.  I have told the patient I will reach out to Dr. Ng with my note and asked him to follow up there for ongoing care.  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  We also discussed MRI of the internal auditory canals which may or may not be indicated (normal-appearing IAC/CPA on limited noncontrast brain scan) depending upon the results of an audiogram not yet  completed.      Patient encouraged to complete the audiogram as ordered and we will hold off on any additional MRI imaging until Dr. Ng has determined if additional head imaging is necessary and we will thereby coordinate the planned MRI of the neck and decide if MRI of the IAC's should be added.

## 2023-06-09 NOTE — TELEPHONE ENCOUNTER
Spoke to the patient to let him know he could go to the ED to get medication for his migraines per Dr. Ng.

## 2023-06-11 ENCOUNTER — PATIENT MESSAGE (OUTPATIENT)
Dept: PRIMARY CARE CLINIC | Facility: CLINIC | Age: 33
End: 2023-06-11
Payer: COMMERCIAL

## 2023-06-12 ENCOUNTER — TELEPHONE (OUTPATIENT)
Dept: NEUROLOGY | Facility: CLINIC | Age: 33
End: 2023-06-12
Payer: COMMERCIAL

## 2023-06-12 NOTE — TELEPHONE ENCOUNTER
----- Message from Savannah Ng MD sent at 6/9/2023  3:21 PM CDT -----  Please update the patient to come to emergency department to received migraine cocktail including magnesium IV 2 g, valproic acid 500 mg IV, Compazine 10 mg IV, Tylenol 1 g IV, normal saline 1 L as part of acute headache management.  Thank you

## 2023-06-16 ENCOUNTER — CLINICAL SUPPORT (OUTPATIENT)
Dept: AUDIOLOGY | Facility: CLINIC | Age: 33
End: 2023-06-16
Payer: COMMERCIAL

## 2023-06-16 DIAGNOSIS — H91.22 SUDDEN IDIOPATHIC HEARING LOSS OF LEFT EAR WITH UNRESTRICTED HEARING OF RIGHT EAR: ICD-10-CM

## 2023-06-16 DIAGNOSIS — H61.23 HEARING LOSS DUE TO CERUMEN IMPACTION, BILATERAL: ICD-10-CM

## 2023-06-16 DIAGNOSIS — H81.22: ICD-10-CM

## 2023-06-16 DIAGNOSIS — H93.12 TINNITUS, LEFT EAR: Primary | ICD-10-CM

## 2023-06-16 DIAGNOSIS — R42 DIZZINESS AND GIDDINESS: ICD-10-CM

## 2023-06-16 PROCEDURE — 92557 PR COMPREHENSIVE HEARING TEST: ICD-10-PCS | Mod: S$GLB,,, | Performed by: AUDIOLOGIST

## 2023-06-16 PROCEDURE — 92557 COMPREHENSIVE HEARING TEST: CPT | Mod: S$GLB,,, | Performed by: AUDIOLOGIST

## 2023-06-16 PROCEDURE — 92567 PR TYMPA2METRY: ICD-10-PCS | Mod: S$GLB,,, | Performed by: AUDIOLOGIST

## 2023-06-16 PROCEDURE — 92567 TYMPANOMETRY: CPT | Mod: S$GLB,,, | Performed by: AUDIOLOGIST

## 2023-06-16 PROCEDURE — 99999 PR PBB SHADOW E&M-EST. PATIENT-LVL I: ICD-10-PCS | Mod: PBBFAC,,, | Performed by: AUDIOLOGIST

## 2023-06-16 PROCEDURE — 99999 PR PBB SHADOW E&M-EST. PATIENT-LVL I: CPT | Mod: PBBFAC,,, | Performed by: AUDIOLOGIST

## 2023-06-16 NOTE — PROGRESS NOTES
Yash Miguel, a 33 y.o. male, was seen today in the clinic for an audiologic evaluation.  Patient reported dizziness that started on 4/29/2023. Dizziness was described as imbalance and lightheadedness. Patient reported dizziness to occur most frequently while outside in the heat. Patient reported perception of decreased hearing in the left ear has improved. Patient reported tinnitus in the left ear only.    Tympanometry revealed Type A in the right ear and Type A in the left ear. Audiogram results revealed normal hearing sensitivity bilaterally. Speech reception thresholds were noted at 10 dB in the right ear and 10 dB in the left ear.  Speech discrimination scores were 100% in the right ear and 100% in the left ear.    Recommendations:  Otologic evaluation  Repeat audiogram as needed  Hearing protection when in noise

## 2023-06-23 ENCOUNTER — TELEPHONE (OUTPATIENT)
Dept: PRIMARY CARE CLINIC | Facility: CLINIC | Age: 33
End: 2023-06-23
Payer: COMMERCIAL

## 2023-06-23 NOTE — TELEPHONE ENCOUNTER
Called pt regarding message. Pt stated he dropped off form at the . Informed pt will check with  to get the form.

## 2023-06-23 NOTE — TELEPHONE ENCOUNTER
I completed this most recent form. If he has any further issues, he needs to schedule an appointment with his PCP

## 2023-06-25 ENCOUNTER — PATIENT MESSAGE (OUTPATIENT)
Dept: OTOLARYNGOLOGY | Facility: CLINIC | Age: 33
End: 2023-06-25
Payer: COMMERCIAL

## 2023-06-26 NOTE — TELEPHONE ENCOUNTER
Please see My Chart message and advise. Do we need to schedule both MRI Soft Tissue Neck W/WO and MRI IAC/Temporal Bones W/WO, or just the Soft tissue neck? Audiogram was completed on 6/16/23. Thanks, Arlene

## 2023-06-27 NOTE — TELEPHONE ENCOUNTER
Left message on voicemail for pt to call back to schedule MRI Soft Tissue Neck at [facility]. Thanks, Arlene   none

## 2023-06-28 NOTE — TELEPHONE ENCOUNTER
Left message on voicemail for pt to call back to schedule MRI Soft Tissue Neck at [facility]. Thanks, Arlene

## 2023-06-29 NOTE — TELEPHONE ENCOUNTER
Left another message on voicemail for pt to call back to schedule MRI Soft Tissue Neck at [facility]. Also sent my chart message with scheduling's number. Thanks, Arlene

## 2023-06-30 ENCOUNTER — PATIENT MESSAGE (OUTPATIENT)
Dept: PRIMARY CARE CLINIC | Facility: CLINIC | Age: 33
End: 2023-06-30
Payer: COMMERCIAL

## 2023-07-19 ENCOUNTER — PATIENT MESSAGE (OUTPATIENT)
Dept: RESEARCH | Facility: HOSPITAL | Age: 33
End: 2023-07-19
Payer: COMMERCIAL

## 2023-07-21 ENCOUNTER — HOSPITAL ENCOUNTER (OUTPATIENT)
Dept: RADIOLOGY | Facility: HOSPITAL | Age: 33
Discharge: HOME | End: 2023-07-21
Attending: OTOLARYNGOLOGY
Payer: COMMERCIAL

## 2023-07-21 DIAGNOSIS — Q27.9 VENOUS LYMPHATIC MALFORMATION: ICD-10-CM

## 2023-07-21 PROCEDURE — A9585 GADOBUTROL INJECTION: HCPCS | Performed by: OTOLARYNGOLOGY

## 2023-07-21 PROCEDURE — 25500020 PHARM REV CODE 255: Performed by: OTOLARYNGOLOGY

## 2023-07-21 PROCEDURE — 70543 MRI ORBT/FAC/NCK W/O &W/DYE: CPT | Mod: TC

## 2023-07-21 PROCEDURE — 70543 MRI ORBT/FAC/NCK W/O &W/DYE: CPT | Mod: 26,,, | Performed by: RADIOLOGY

## 2023-07-21 PROCEDURE — 70543 MRI SOFT TISSUE NECK W W/O CONTRAST: ICD-10-PCS | Mod: 26,,, | Performed by: RADIOLOGY

## 2023-07-21 RX ORDER — GADOBUTROL 604.72 MG/ML
10 INJECTION INTRAVENOUS
Status: COMPLETED | OUTPATIENT
Start: 2023-07-21 | End: 2023-07-21

## 2023-07-21 RX ADMIN — GADOBUTROL 10 ML: 604.72 INJECTION INTRAVENOUS at 08:07

## 2023-08-15 ENCOUNTER — PATIENT MESSAGE (OUTPATIENT)
Dept: OTOLARYNGOLOGY | Facility: CLINIC | Age: 33
End: 2023-08-15
Payer: COMMERCIAL

## 2023-09-18 ENCOUNTER — PATIENT MESSAGE (OUTPATIENT)
Dept: PRIMARY CARE CLINIC | Facility: CLINIC | Age: 33
End: 2023-09-18
Payer: COMMERCIAL

## 2023-10-18 ENCOUNTER — PATIENT MESSAGE (OUTPATIENT)
Dept: CARDIOLOGY | Facility: CLINIC | Age: 33
End: 2023-10-18
Payer: COMMERCIAL

## 2023-11-03 ENCOUNTER — PATIENT MESSAGE (OUTPATIENT)
Dept: OTOLARYNGOLOGY | Facility: CLINIC | Age: 33
End: 2023-11-03

## 2023-11-03 ENCOUNTER — TELEPHONE (OUTPATIENT)
Dept: OTOLARYNGOLOGY | Facility: CLINIC | Age: 33
End: 2023-11-03
Payer: COMMERCIAL

## 2023-11-03 NOTE — TELEPHONE ENCOUNTER
Patient was a no-show for his appointment today.  Has not been seen since June.  July MRI reviewed.  Audiogram also reviewed (normal).  Findings on MRI as below.    We will try to reach out of the patient's see if he wants to proceed with excision of this mass either at [facility] or on an outpatient basis at [facility]    07/21/2023 MRI soft tissue neck with and without contrast  Images reviewed as well as report left submandibular dominant mass with hyperintense T2/fluid signal and calcifications most consistent with phleboliths or calcifications of lymphovascular malformation.  Radiology mentions recurrent pleomorphic adenoma.  There is no history pleomorphic adenoma and findings are not consistent with same though there is somewhat indistinguishable plane between the likely lymphovascular malformation in the submandibular gland on my review.    Mass seems to go through/involve the platysma that or between the edges the platysma medially and inferiorly.

## 2023-11-29 ENCOUNTER — TELEPHONE (OUTPATIENT)
Dept: OTOLARYNGOLOGY | Facility: CLINIC | Age: 33
End: 2023-11-29

## 2023-12-01 NOTE — TELEPHONE ENCOUNTER
We have sent messages and tried calling patient. We have not gotten any reply from patient. Please advise. Thanks, Arlene

## 2024-02-17 ENCOUNTER — PATIENT MESSAGE (OUTPATIENT)
Dept: PRIMARY CARE CLINIC | Facility: CLINIC | Age: 34
End: 2024-02-17
Payer: COMMERCIAL

## 2024-04-02 ENCOUNTER — OFFICE VISIT (OUTPATIENT)
Dept: URGENT CARE | Facility: CLINIC | Age: 34
End: 2024-04-02
Payer: COMMERCIAL

## 2024-04-02 VITALS
HEART RATE: 76 BPM | WEIGHT: 233 LBS | BODY MASS INDEX: 35.43 KG/M2 | TEMPERATURE: 98 F | SYSTOLIC BLOOD PRESSURE: 121 MMHG | DIASTOLIC BLOOD PRESSURE: 63 MMHG | RESPIRATION RATE: 18 BRPM | OXYGEN SATURATION: 100 %

## 2024-04-02 DIAGNOSIS — R51.9 ACUTE NONINTRACTABLE HEADACHE, UNSPECIFIED HEADACHE TYPE: Primary | ICD-10-CM

## 2024-04-02 DIAGNOSIS — H65.92 FLUID LEVEL BEHIND TYMPANIC MEMBRANE OF LEFT EAR: ICD-10-CM

## 2024-04-02 PROCEDURE — 99213 OFFICE O/P EST LOW 20 MIN: CPT | Mod: 25,S$GLB,,

## 2024-04-02 PROCEDURE — 96372 THER/PROPH/DIAG INJ SC/IM: CPT | Mod: S$GLB,,,

## 2024-04-02 RX ORDER — CETIRIZINE HYDROCHLORIDE 10 MG/1
10 TABLET ORAL DAILY
Qty: 30 TABLET | Refills: 0 | Status: SHIPPED | OUTPATIENT
Start: 2024-04-02 | End: 2024-05-02

## 2024-04-02 RX ORDER — KETOROLAC TROMETHAMINE 30 MG/ML
30 INJECTION, SOLUTION INTRAMUSCULAR; INTRAVENOUS
Status: COMPLETED | OUTPATIENT
Start: 2024-04-02 | End: 2024-04-02

## 2024-04-02 RX ORDER — PREDNISONE 10 MG/1
10 TABLET ORAL 2 TIMES DAILY
COMMUNITY
Start: 2024-03-14

## 2024-04-02 RX ORDER — FLUTICASONE PROPIONATE 50 MCG
1 SPRAY, SUSPENSION (ML) NASAL DAILY
Qty: 9.9 ML | Refills: 0 | Status: SHIPPED | OUTPATIENT
Start: 2024-04-02 | End: 2024-05-02

## 2024-04-02 RX ADMIN — KETOROLAC TROMETHAMINE 30 MG: 30 INJECTION, SOLUTION INTRAMUSCULAR; INTRAVENOUS at 10:04

## 2024-04-02 NOTE — LETTER
April 2, 2024      [facility] [place]  [address] [place] [address]  Phone: [phone]  Fax: [phone]       Patient: Yash Miguel   YOB: 1990  Date of Visit: 04/02/2024    To Whom It May Concern:    Aditya Miguel  was at [facility] on 04/02/2024. The patient may return to work/school on 04/03/24 with no restrictions. If you have any questions or concerns, or if I can be of further assistance, please do not hesitate to contact me.    Sincerely,    Giselle Alvarado PA-C

## 2024-04-02 NOTE — PROGRESS NOTES
Subjective:      Patient ID: Yash Miguel is a 33 y.o. male.    Vitals:  weight is 105.7 kg (233 lb). His oral temperature is 98 °F (36.7 °C). His blood pressure is 121/63 and his pulse is 76. His respiration is 18 and oxygen saturation is 100%.     Chief Complaint: Tension Headache    33 year old male presents with headache that started two days ago. He first noticed them 2 weeks ago. HE was seen by outside ENT and they prescribed him prednisone as they though headaches were related to a TM dysfunction. Pt. States pain in head comes and goes. Pain has been constant since yesterday. Pt.states pain radiates to back of left ear. Denies change in vision, photophobia, tingling or numbness.     Headache   The current episode started in the past 7 days. The problem occurs every few minutes. The problem has been unchanged. The pain radiates to the face. The quality of the pain is described as sharp. The pain is at a severity of 9/10. The pain is severe. Pertinent negatives include no abdominal pain, abnormal behavior, anorexia, back pain, blurred vision, coughing, dizziness, drainage, ear pain, eye pain, eye redness, eye watering, facial sweating, fever, hearing loss, insomnia, loss of balance, muscle aches, nausea, neck pain, numbness, phonophobia, photophobia, rhinorrhea, scalp tenderness, seizures, sinus pressure, sore throat, swollen glands, tingling, tinnitus, visual change, vomiting, weakness or weight loss. Nothing aggravates the symptoms. He has tried acetaminophen for the symptoms. The treatment provided no relief.       Constitution: Negative for fever.   HENT:  Negative for ear pain, tinnitus, hearing loss, sinus pressure and sore throat.    Neck: Negative for neck pain.   Eyes:  Negative for eye pain, eye redness, photophobia and blurred vision.   Respiratory:  Negative for cough.    Gastrointestinal:  Negative for abdominal pain, nausea and vomiting.   Musculoskeletal:  Negative for back pain.    Neurological:  Positive for headaches. Negative for dizziness, loss of balance, numbness and seizures.   Psychiatric/Behavioral:  The patient does not have insomnia.       Objective:     Physical Exam   Constitutional: He is oriented to person, place, and time. He appears well-developed.  Non-toxic appearance. He does not appear ill. No distress.      Comments:Patient sits comfortably in exam chair. Answers questions in complete sentences. Does not show any signs of distress or discoloration.        HENT:   Head: Normocephalic and atraumatic.   Ears:   Right Ear: Hearing, tympanic membrane, external ear and ear canal normal. There is cerumen present.   Left Ear: Hearing, external ear and ear canal normal. A middle ear effusion is present.   Nose: Nose normal. No mucosal edema, rhinorrhea or nasal deformity. No epistaxis. Right sinus exhibits no maxillary sinus tenderness and no frontal sinus tenderness. Left sinus exhibits no maxillary sinus tenderness and no frontal sinus tenderness.   Mouth/Throat: Uvula is midline, oropharynx is clear and moist and mucous membranes are normal. No trismus in the jaw. Normal dentition. No uvula swelling. No posterior oropharyngeal erythema.   Eyes: Conjunctivae, EOM and lids are normal. Pupils are equal, round, and reactive to light. No scleral icterus.      Comments: PERRLA   Neck: Trachea normal and phonation normal. Neck supple. No neck rigidity present.   Cardiovascular: Normal rate, regular rhythm, normal heart sounds and normal pulses.   Pulmonary/Chest: Effort normal and breath sounds normal. No respiratory distress.   Abdominal: Normal appearance and bowel sounds are normal. He exhibits no distension. Soft. There is no abdominal tenderness.   Musculoskeletal: Normal range of motion.         General: No deformity. Normal range of motion.   Neurological: no focal deficit. He is alert and oriented to person, place, and time. He has normal motor skills, normal sensation and  intact cranial nerves (2-12). No cranial nerve deficit. He exhibits normal muscle tone. Gait and coordination normal. Coordination normal. GCS eye subscore is 4. GCS verbal subscore is 5. GCS motor subscore is 6.   Skin: Skin is warm, dry, intact, not diaphoretic and not pale.   Psychiatric: His speech is normal and behavior is normal. Judgment and thought content normal.   Nursing note and vitals reviewed.      Assessment:     1. Acute nonintractable headache, unspecified headache type    2. Fluid level behind tympanic membrane of left ear        Plan:       Acute nonintractable headache, unspecified headache type  -     ketorolac injection 30 mg  -     fluticasone propionate (FLONASE) 50 mcg/actuation nasal spray; 1 spray (50 mcg total) by Each Nostril route once daily.  Dispense: 9.9 mL; Refill: 0  -     cetirizine (ZYRTEC) 10 MG tablet; Take 1 tablet (10 mg total) by mouth once daily.  Dispense: 30 tablet; Refill: 0    Fluid level behind tympanic membrane of left ear  -     fluticasone propionate (FLONASE) 50 mcg/actuation nasal spray; 1 spray (50 mcg total) by Each Nostril route once daily.  Dispense: 9.9 mL; Refill: 0  -     cetirizine (ZYRTEC) 10 MG tablet; Take 1 tablet (10 mg total) by mouth once daily.  Dispense: 30 tablet; Refill: 0                  Patient Instructions   - Rest.    - Drink plenty of fluids.    - Take excedrin as needed for headaches.     - You must understand that you have received an Urgent Care treatment only and that you may be released before all of your medical problems are known or treated.   - You, the patient, will arrange for follow up care as instructed.   - If your condition worsens or fails to improve we recommend that you receive another evaluation at the ER immediately or contact your PCP to discuss your concerns or return here.   - Follow up with your PCP or specialty clinic as directed in the next 1-2 weeks if not improved or as needed.  You can call [phone] to  schedule an appointment with the appropriate provider.    If your symptoms do not improve or worsen, go to the emergency room immediately.

## 2024-04-02 NOTE — PATIENT INSTRUCTIONS
- Rest.    - Drink plenty of fluids.    - Take excedrin as needed for headaches.     - You must understand that you have received an Urgent Care treatment only and that you may be released before all of your medical problems are known or treated.   - You, the patient, will arrange for follow up care as instructed.   - If your condition worsens or fails to improve we recommend that you receive another evaluation at the ER immediately or contact your PCP to discuss your concerns or return here.   - Follow up with your PCP or specialty clinic as directed in the next 1-2 weeks if not improved or as needed.  You can call [phone] to schedule an appointment with the appropriate provider.    If your symptoms do not improve or worsen, go to the emergency room immediately.

## 2024-05-20 ENCOUNTER — OFFICE VISIT (OUTPATIENT)
Dept: URGENT CARE | Facility: CLINIC | Age: 34
End: 2024-05-20
Payer: COMMERCIAL

## 2024-05-20 VITALS
HEIGHT: 68 IN | DIASTOLIC BLOOD PRESSURE: 62 MMHG | OXYGEN SATURATION: 98 % | SYSTOLIC BLOOD PRESSURE: 114 MMHG | BODY MASS INDEX: 35.31 KG/M2 | TEMPERATURE: 99 F | RESPIRATION RATE: 18 BRPM | HEART RATE: 76 BPM | WEIGHT: 233 LBS

## 2024-05-20 DIAGNOSIS — S29.011A MUSCLE STRAIN OF CHEST WALL, INITIAL ENCOUNTER: Primary | ICD-10-CM

## 2024-05-20 PROCEDURE — 99213 OFFICE O/P EST LOW 20 MIN: CPT | Mod: 25,S$GLB,, | Performed by: FAMILY MEDICINE

## 2024-05-20 PROCEDURE — 96372 THER/PROPH/DIAG INJ SC/IM: CPT | Mod: S$GLB,,, | Performed by: FAMILY MEDICINE

## 2024-05-20 RX ORDER — NAPROXEN 500 MG/1
500 TABLET ORAL 2 TIMES DAILY WITH MEALS
Qty: 30 TABLET | Refills: 0 | Status: SHIPPED | OUTPATIENT
Start: 2024-05-20

## 2024-05-20 RX ORDER — CYCLOBENZAPRINE HCL 10 MG
10 TABLET ORAL NIGHTLY
Qty: 10 TABLET | Refills: 0 | Status: SHIPPED | OUTPATIENT
Start: 2024-05-20 | End: 2024-05-30

## 2024-05-20 RX ORDER — KETOROLAC TROMETHAMINE 30 MG/ML
30 INJECTION, SOLUTION INTRAMUSCULAR; INTRAVENOUS
Status: COMPLETED | OUTPATIENT
Start: 2024-05-20 | End: 2024-05-20

## 2024-05-20 RX ADMIN — KETOROLAC TROMETHAMINE 30 MG: 30 INJECTION, SOLUTION INTRAMUSCULAR; INTRAVENOUS at 11:05

## 2024-05-20 NOTE — LETTER
May 20, 2024      [facility] [place]  [address] [address]  Phone: [phone]  Fax: [phone]       Patient: Yash Miguel   YOB: 1990  Date of Visit: 05/20/2024    To Whom It May Concern:    Aditya Miguel  was at [facility] on 05/20/2024. The patient may return to work/school on 05/21/2024 with no restrictions. If you have any questions or concerns, or if I can be of further assistance, please do not hesitate to contact me.    Sincerely,    Pawan Buchanan MD

## 2024-05-20 NOTE — PROGRESS NOTES
"Subjective:      Patient ID: Yash Miguel is a 33 y.o. male.    Vitals:  height is 5' 8" (1.727 m) and weight is 105.7 kg (233 lb 0.4 oz). His oral temperature is 99.3 °F (37.4 °C). His blood pressure is 114/62 and his pulse is 76. His respiration is 18 and oxygen saturation is 98%.     Chief Complaint: Arm Pain    Pt presents under arm pain and side pain that started today after lifting an heavy item at work.     Arm Pain   The incident occurred 1 to 3 hours ago. The incident occurred at work. There was no injury mechanism. The quality of the pain is described as aching. The pain is at a severity of 10/10. The pain is moderate. The symptoms are aggravated by lifting. He has tried nothing for the symptoms.     ROS   Objective:     Physical Exam   Constitutional: He is oriented to person, place, and time. He appears well-developed. He is cooperative.   HENT:   Head: Normocephalic and atraumatic.   Ears:   Right Ear: Hearing, tympanic membrane, external ear and ear canal normal.   Left Ear: Hearing, tympanic membrane, external ear and ear canal normal.   Nose: Nose normal. No mucosal edema or nasal deformity. No epistaxis. Right sinus exhibits no maxillary sinus tenderness and no frontal sinus tenderness. Left sinus exhibits no maxillary sinus tenderness and no frontal sinus tenderness.   Mouth/Throat: Uvula is midline, oropharynx is clear and moist and mucous membranes are normal. No trismus in the jaw. Normal dentition. No uvula swelling.   Eyes: Conjunctivae and lids are normal.   Neck: Trachea normal and phonation normal. Neck supple.   Cardiovascular: Normal rate, regular rhythm, normal heart sounds and normal pulses.   Pulmonary/Chest: Effort normal and breath sounds normal.   Abdominal: Normal appearance and bowel sounds are normal. Soft.   Musculoskeletal: Normal range of motion.         General: Normal range of motion.        Arms:    Neurological: He is alert and oriented to person, place, and time. He " exhibits normal muscle tone.   Skin: Skin is warm, dry and intact.   Psychiatric: His speech is normal and behavior is normal. Judgment and thought content normal.   Nursing note and vitals reviewed.      Assessment:     1. Muscle strain of chest wall, initial encounter        Plan:       Muscle strain of chest wall, initial encounter  -     ketorolac injection 30 mg  -     naproxen (NAPROSYN) 500 MG tablet; Take 1 tablet (500 mg total) by mouth 2 (two) times daily with meals.  Dispense: 30 tablet; Refill: 0  -     cyclobenzaprine (FLEXERIL) 10 MG tablet; Take 1 tablet (10 mg total) by mouth every evening. for 10 days  Dispense: 10 tablet; Refill: 0      Thank you for choosing [facility]!     Our goal in the Urgent Care is to always provide outstanding medical care. You may receive a survey by mail or e-mail in the next week regarding your experience today. We would greatly appreciate you completing and returning the survey. Your feedback provides us with a way to recognize our staff who provide very good care, and it helps us learn how to improve when your experience was below our aspiration of excellence.       We appreciate you trusting us with your medical care. We hope you feel better soon. We will be happy to take care of you for all of your future medical needs.  You must understand that you've received an Urgent Care treatment only and that you may be released before all your medical problems are known or treated. You, the patient, will arrange for follow up care as instructed.  Follow up with your PCP or specialty clinic as directed in the next 1-2 weeks if not improved or as needed.  You can call [phone] to schedule an appointment with the appropriate provider.  Another option is to follow up with [facility] ([email or web address]) virtually for quick simple medical advice.  If your condition worsens we recommend that you receive another  evaluation at the emergency room immediately or contact your primary medical clinics after hours call service to discuss your concerns.  Please return here or go to the Emergency Department for any concerns or worsening of condition.      *If you were prescribed a narcotic or controlled medication, do not drive or operate heavy equipment or machinery while taking these medications.

## 2024-09-19 ENCOUNTER — PATIENT MESSAGE (OUTPATIENT)
Dept: PRIMARY CARE CLINIC | Facility: CLINIC | Age: 34
End: 2024-09-19
Payer: COMMERCIAL

## 2024-10-11 ENCOUNTER — PATIENT MESSAGE (OUTPATIENT)
Dept: PRIMARY CARE CLINIC | Facility: CLINIC | Age: 34
End: 2024-10-11
Payer: COMMERCIAL

## 2024-10-14 ENCOUNTER — TELEPHONE (OUTPATIENT)
Dept: PRIMARY CARE CLINIC | Facility: CLINIC | Age: 34
End: 2024-10-14
Payer: COMMERCIAL

## 2024-10-14 NOTE — TELEPHONE ENCOUNTER
----- Message from Heidi sent at 10/14/2024  9:53 AM CDT -----  Contact: Pt  [phone]  Patient is returning a phone call.    Who left a message for the patient:  Reyes, Lauren, LPN    Does patient know what this is regarding:  yes    Would you like a call back, or a response through your [facility] portal?:   Call back    Comments:     Please call and advise.    Thank You

## 2024-10-15 ENCOUNTER — OFFICE VISIT (OUTPATIENT)
Dept: PRIMARY CARE CLINIC | Facility: CLINIC | Age: 34
End: 2024-10-15
Payer: COMMERCIAL

## 2024-10-15 VITALS
RESPIRATION RATE: 18 BRPM | DIASTOLIC BLOOD PRESSURE: 77 MMHG | OXYGEN SATURATION: 99 % | HEIGHT: 68 IN | TEMPERATURE: 98 F | SYSTOLIC BLOOD PRESSURE: 123 MMHG | HEART RATE: 76 BPM | WEIGHT: 225.88 LBS | BODY MASS INDEX: 34.23 KG/M2

## 2024-10-15 DIAGNOSIS — R42 VERTIGO: Primary | ICD-10-CM

## 2024-10-15 PROCEDURE — 99214 OFFICE O/P EST MOD 30 MIN: CPT | Mod: S$GLB,,, | Performed by: FAMILY MEDICINE

## 2024-10-15 PROCEDURE — 99999 PR PBB SHADOW E&M-EST. PATIENT-LVL V: CPT | Mod: PBBFAC,,, | Performed by: FAMILY MEDICINE

## 2024-10-15 PROCEDURE — 1160F RVW MEDS BY RX/DR IN RCRD: CPT | Mod: CPTII,S$GLB,, | Performed by: FAMILY MEDICINE

## 2024-10-15 PROCEDURE — 3008F BODY MASS INDEX DOCD: CPT | Mod: CPTII,S$GLB,, | Performed by: FAMILY MEDICINE

## 2024-10-15 PROCEDURE — 3074F SYST BP LT 130 MM HG: CPT | Mod: CPTII,S$GLB,, | Performed by: FAMILY MEDICINE

## 2024-10-15 PROCEDURE — 3078F DIAST BP <80 MM HG: CPT | Mod: CPTII,S$GLB,, | Performed by: FAMILY MEDICINE

## 2024-10-15 PROCEDURE — 1159F MED LIST DOCD IN RCRD: CPT | Mod: CPTII,S$GLB,, | Performed by: FAMILY MEDICINE

## 2024-10-15 RX ORDER — MECLIZINE HYDROCHLORIDE 25 MG/1
25 TABLET ORAL 3 TIMES DAILY PRN
Qty: 30 TABLET | Refills: 3 | Status: SHIPPED | OUTPATIENT
Start: 2024-10-15

## 2024-10-15 RX ORDER — PREDNISONE 20 MG/1
TABLET ORAL
Qty: 30 TABLET | Refills: 0 | Status: SHIPPED | OUTPATIENT
Start: 2024-10-15 | End: 2024-10-30

## 2024-10-15 NOTE — PROGRESS NOTES
Assessment:       1. Vertigo         Plan:       Vertigo  -     Ambulatory referral/consult to Neurology; Future; Expected date: 10/22/2024  -     meclizine (ANTIVERT) 25 mg tablet; Take 1 tablet (25 mg total) by mouth 3 (three) times daily as needed for Dizziness.  Dispense: 30 tablet; Refill: 3  -     predniSONE (DELTASONE) 20 MG tablet; Take 3 tablets (60 mg total) by mouth once daily for 5 days, THEN 2 tablets (40 mg total) once daily for 5 days, THEN 1 tablet (20 mg total) once daily for 5 days.  Dispense: 30 tablet; Refill: 0      Assessment & Plan    - Assessed recurring vertigo symptoms, likely benign paroxysmal positional vertigo (BPPV)  - Evaluated frequency and severity of symptoms, noting occurrence 2-3 times per week, particularly when exiting vehicles  - Reviewed associated symptoms including nausea, dizziness, and occasional tinnitus  - Determined need for neurological re-evaluation due to ongoing symptoms  - Will restart meclizine for symptomatic relief and initiate prednisone taper  - Assessed orthostatic vital signs, found to be normal    BENIGN PAROXYSMAL POSITIONAL VERTIGO (BPPV):   Explained BPPV mechanism, including role of inner ear structures and semicircular canals in balance.   Discussed potential causes of vertigo, including viruses, allergies, and random occurrence.   Clarified that vertigo is often not related to mechanical issues or inflammation.   Patient to avoid frequent position changes, such as climbing in and out of large vehicles.   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   Started meclizine as needed for symptomatic relief of vertigo.   Started prednisone taper: 60 mg daily for 5 days, then 40 mg daily for 5 days, then 20 mg daily for 5 days, then discontinue.   Orthostatic vital signs performed.    NEUROLOGY REFERRAL:   Referred to neurology for re-evaluation of vertigo symptoms.   Follow up with neurologist as instructed.    FOLLOW UP:   Contact  the office for work note for the day.   Contact the office for letter detailing recommended work restrictions.       Medication List with Changes/Refills   New Medications    MECLIZINE (ANTIVERT) 25 MG TABLET    Take 1 tablet (25 mg total) by mouth 3 (three) times daily as needed for Dizziness.    PREDNISONE (DELTASONE) 20 MG TABLET    Take 3 tablets (60 mg total) by mouth once daily for 5 days, THEN 2 tablets (40 mg total) once daily for 5 days, THEN 1 tablet (20 mg total) once daily for 5 days.   Discontinued Medications    BANOPHEN 25 MG CAPSULE    Take 25 mg by mouth.    CETIRIZINE (ZYRTEC) 10 MG TABLET    Take 1 tablet (10 mg total) by mouth once daily.    GABAPENTIN (NEURONTIN) 100 MG CAPSULE    Take 1 capsule (100 mg total) by mouth 3 (three) times daily.    NAPROXEN (NAPROSYN) 500 MG TABLET    Take 1 tablet (500 mg total) by mouth 2 (two) times daily with meals.    PREDNISONE (DELTASONE) 10 MG TABLET    Take 10 mg by mouth 2 (two) times daily.    PROCHLORPERAZINE (COMPAZINE) 10 MG TABLET    Take 1 tablet (10 mg total) by mouth 3 (three) times daily.         Subjective:    Patient ID: Yash Miguel is a 34 y.o. male.  Chief Complaint: Follow-up (vertigo )    HPI  History of Present Illness    CHIEF COMPLAINT:  Patient presents today for follow up on recurring vertigo.    VERTIGO AND ASSOCIATED SYMPTOMS:  He experiences vertigo episodes 2-3 times per week, lasting 1-2 minutes each. Triggers include exiting vehicles after driving and bending over. Symptoms include dizziness, nausea, room spinning sensation, and feeling off-balance. Severity ranges from 3 to 10 on a scale of 1-10. He often needs to hold onto his car to regain balance. He denies vomiting, hearing loss, or tinnitus during these episodes. He reports intermittent tinnitus, but is unable to recall the frequency. He experiences palpitations associated with anxiety, particularly when anticipating dizziness while exiting vehicles. He denies chest  "pain with palpitations.    HEADACHES:  He reports tension headaches localized to one side of the head. These have improved compared to the past when they required frequent urgent care visits. Currently, the headaches are less intense and widespread.    VISION CHANGES:  He started wearing glasses in September due to worsening vision, which he attributes to a prior vertigo episode. He experiences frequent eye burning sensations triggered by sunlight and shower mist, with occasional random occurrences without apparent cause.    OCCUPATIONAL IMPACT:  He reports difficulty getting in and out of large work vehicles, with exiting being particularly challenging. Smaller vehicles are easier for him to manage.    MEDICATION HISTORY:  He previously used meclizine and Valium for dizziness and nausea, as well as a short course of prednisone prescribed by a neurologist. He found these medications helpful and expresses interest in potentially resuming treatment, particularly for nausea management.    PAST MEDICAL HISTORY:  He last saw a neurologist approximately 1.5 years ago and denies any subsequent neurological consultations.      ROS:  Constitutional: +dizziness  Head: +headaches  Eyes: +vision changes, +eye pain  ENT: +tinnitus  Cardiovascular: -chest pain, +palpitations  Gastrointestinal: +nausea, -vomiting       Review of Systems    Objective:      Vitals:    10/15/24 1046 10/15/24 1133 10/15/24 1134 10/15/24 1135   BP: 122/76 119/75 122/74 123/77   Pulse: 76      Resp: 18      Temp: 97.8 °F (36.6 °C)      TempSrc: Temporal      SpO2: 99%      Weight: 102.4 kg (225 lb 13.8 oz)      Height: 5' 8" (1.727 m)        BP Readings from Last 5 Encounters:   10/15/24 123/77   05/20/24 114/62   04/02/24 121/63   06/09/23 127/78   05/31/23 121/62     Wt Readings from Last 5 Encounters:   10/15/24 102.4 kg (225 lb 13.8 oz)   05/20/24 105.7 kg (233 lb 0.4 oz)   04/02/24 105.7 kg (233 lb)   06/09/23 105.7 kg (233 lb 0.4 oz)   05/31/23 " 106.9 kg (235 lb 12.5 oz)     Physical Exam  Physical Exam    General: Well-developed. Well-nourished. No acute distress.  Eyes: EOMI. Sclerae anicteric.  HENT: Normocephalic. Atraumatic. Nares patent. Moist oral mucosa.  Cardiovascular: Regular rate. Regular rhythm. No murmurs. No rubs. No gallops. Normal S1, S2.  Respiratory: Normal respiratory effort. Clear to auscultation bilaterally. No rales. No rhonchi. No wheezing.  Musculoskeletal: No  obvious deformity.  Extremities: No lower extremity edema.  Neurological: Alert & oriented x3. No slurred speech. Normal gait.  Psychiatric: Normal mood. Normal affect. Good insight. Good judgment.  Skin: Warm. Dry. No rash.  Mouth: Normal oropharynx.         Lab Results   Component Value Date    WBC 9.58 05/01/2023    HGB 13.3 (L) 05/01/2023    HCT 42.7 05/01/2023     05/01/2023    CHOL 186 04/25/2023    TRIG 94 04/25/2023    HDL 38 (L) 04/25/2023    ALT 21 05/01/2023    AST 26 05/01/2023     05/01/2023    K 4.0 05/01/2023     05/01/2023    CREATININE 0.9 05/01/2023    BUN 9 05/01/2023    CO2 21 (L) 05/01/2023    TSH 0.791 04/25/2023      This note was generated with the assistance of ambient listening technology. Verbal consent was obtained by the patient and accompanying visitor(s) for the recording of patient appointment to facilitate this note. I attest to having reviewed and edited the generated note for accuracy, though some syntax or spelling errors may persist. Please contact the author of this note for any clarification.

## 2024-10-15 NOTE — LETTER
October 15, 2024      [facility] [address]  [address]  [place] [address]  Phone: [phone]  Fax: [phone]       Patient: Yash Miguel   YOB: 1990  Date of Visit: 10/15/2024    To Whom It May Concern:    Aditya Miguel  was at [facility] on 10/15/2024. The patient may return to work/school on 10/16/24. If you have any questions or concerns, or if I can be of further assistance, please do not hesitate to contact me.    Sincerely,    Michelle Anderson MA

## 2024-10-15 NOTE — LETTER
October 15, 2024    Yash Miguel  [address]  [facility] [address]             [facility] [address]  [facility] [address]  [facility] [address]  Phone: [phone]  Fax: [phone] To whom it may concern:    Due to an ongoing medical issue, Mr. Miguel should avoid frequent position changes such as climbing up and down out of large vehicles, as this has a propensity to exacerbate his condition. He is able to safely operate standard sized vehicles.    Respectfully,     Manuel Santacruz MD

## 2025-03-18 ENCOUNTER — OFFICE VISIT (OUTPATIENT)
Dept: URGENT CARE | Facility: CLINIC | Age: 35
End: 2025-03-18
Payer: COMMERCIAL

## 2025-03-18 VITALS
HEART RATE: 79 BPM | WEIGHT: 225.88 LBS | HEIGHT: 68 IN | RESPIRATION RATE: 16 BRPM | BODY MASS INDEX: 34.23 KG/M2 | OXYGEN SATURATION: 98 % | DIASTOLIC BLOOD PRESSURE: 70 MMHG | SYSTOLIC BLOOD PRESSURE: 114 MMHG | TEMPERATURE: 99 F

## 2025-03-18 DIAGNOSIS — T14.8XXA MUSCLE STRAIN: Primary | ICD-10-CM

## 2025-03-18 PROCEDURE — 99214 OFFICE O/P EST MOD 30 MIN: CPT | Mod: 25,S$GLB,, | Performed by: NURSE PRACTITIONER

## 2025-03-18 PROCEDURE — 96372 THER/PROPH/DIAG INJ SC/IM: CPT | Mod: S$GLB,,, | Performed by: NURSE PRACTITIONER

## 2025-03-18 RX ORDER — KETOROLAC TROMETHAMINE 30 MG/ML
30 INJECTION, SOLUTION INTRAMUSCULAR; INTRAVENOUS
Status: COMPLETED | OUTPATIENT
Start: 2025-03-18 | End: 2025-03-18

## 2025-03-18 RX ORDER — METHOCARBAMOL 750 MG/1
750 TABLET, FILM COATED ORAL 3 TIMES DAILY PRN
Qty: 30 TABLET | Refills: 0 | Status: SHIPPED | OUTPATIENT
Start: 2025-03-18

## 2025-03-18 RX ORDER — NAPROXEN 500 MG/1
500 TABLET ORAL 2 TIMES DAILY
Qty: 20 TABLET | Refills: 0 | Status: SHIPPED | OUTPATIENT
Start: 2025-03-18

## 2025-03-18 RX ADMIN — KETOROLAC TROMETHAMINE 30 MG: 30 INJECTION, SOLUTION INTRAMUSCULAR; INTRAVENOUS at 09:03

## 2025-03-18 NOTE — PROGRESS NOTES
"Subjective:      Patient ID: Yash Miguel is a 34 y.o. male.    Vitals:  height is 5' 8" (1.727 m) and weight is 102.5 kg (225 lb 13.8 oz). His oral temperature is 98.5 °F (36.9 °C). His blood pressure is 114/70 and his pulse is 79. His respiration is 16 and oxygen saturation is 98%.     Chief Complaint: Arm Pain    34 yr old male presents to the Urgent Care with complaint of left arm strain today. Patient was carrying magazines and felt a pull and stretch in muscle. Patient reported a similar episode a few years ago. Patient denies any recent injury or trauma.     Arm Pain   His dominant hand is their left hand. The incident occurred less than 1 hour ago. The injury mechanism is unknown. The pain does not radiate. The pain is at a severity of 7/10. The pain is moderate. The pain has been Constant since the incident. Pertinent negatives include no numbness or tingling. Nothing aggravates the symptoms. He has tried nothing for the symptoms.     Musculoskeletal:  Positive for pain, joint pain and muscle ache.   Neurological:  Negative for numbness.      Objective:     Physical Exam   Constitutional: He appears well-developed. He is cooperative.  Non-toxic appearance. He does not appear ill. normal  Neck: Neck supple.   Cardiovascular: Normal rate, regular rhythm and normal pulses.   Pulses:       Radial pulses are 2+ on the right side and 2+ on the left side.   Pulmonary/Chest: Effort normal.   Abdominal: Normal appearance.   Musculoskeletal:      Left upper arm: He exhibits no tenderness, no bony tenderness, no swelling, no edema, no deformity and no laceration.        Arms:       Right lower leg: No edema.      Left lower leg: No edema.      Comments: Full ROM noted with lifting arm.    Neurological: He is alert.   Skin: Skin is warm, dry, intact and not diaphoretic. Capillary refill takes less than 2 seconds.   Psychiatric: He experiences Normal attention. His speech is normal and behavior is normal. Mood and " thought content normal.   Nursing note and vitals reviewed.      Assessment:     1. Muscle strain      Plan:   1) Toradol IM injection given in UC  2) Robaxin prescribed - pt instructed to take as directed  3) Pt instructed to utilize heating pad as needed  4) Pt encouraged to utilize massage as needed  5) Pt instructed to rest and refrain from strenuous activity  6) Pt instructed to follow-up with primary care provider  7) Pt instructed to return to ER as needed if symptoms worsen/fail to improve  8) Pt verbalized understanding of discharge instructions and treatment plan      Muscle strain  -     ketorolac injection 30 mg  -     methocarbamoL (ROBAXIN) 750 MG Tab; Take 1 tablet (750 mg total) by mouth 3 (three) times daily as needed (Muscle relaxant).  Dispense: 30 tablet; Refill: 0  -     naproxen (NAPROSYN) 500 MG tablet; Take 1 tablet (500 mg total) by mouth 2 (two) times daily.  Dispense: 20 tablet; Refill: 0      Patient Instructions   If you were prescribed a narcotic or controlled medication, do not drive or operate heavy equipment or machinery while taking these medications.  You must understand that you've received an Urgent Care treatment only and that you may be released before all your medical problems are known or treated. You, the patient, will arrange for follow up care as instructed.  Follow up with your PCP or specialty clinic as directed within 2-5 days if not improved or as needed.  You can call [phone] to schedule an appointment with the appropriate provider.  If your condition worsens we recommend that you receive another evaluation at the emergency room immediately or contact your primary medical clinics after hours call service to discuss your concerns.  Please return here or go to the Emergency Department for any concerns or worsening of condition.

## 2025-03-18 NOTE — PATIENT INSTRUCTIONS

## 2025-08-29 ENCOUNTER — OFFICE VISIT (OUTPATIENT)
Dept: PRIMARY CARE CLINIC | Facility: CLINIC | Age: 35
End: 2025-08-29
Payer: COMMERCIAL

## 2025-08-29 VITALS
WEIGHT: 230.69 LBS | RESPIRATION RATE: 18 BRPM | TEMPERATURE: 98 F | BODY MASS INDEX: 34.96 KG/M2 | HEART RATE: 70 BPM | OXYGEN SATURATION: 99 % | SYSTOLIC BLOOD PRESSURE: 112 MMHG | DIASTOLIC BLOOD PRESSURE: 66 MMHG | HEIGHT: 68 IN

## 2025-08-29 DIAGNOSIS — D48.5 NEOPLASM OF UNCERTAIN BEHAVIOR OF SKIN: ICD-10-CM

## 2025-08-29 DIAGNOSIS — R42 VERTIGO: ICD-10-CM

## 2025-08-29 DIAGNOSIS — G89.29 CHRONIC RIGHT-SIDED THORACIC BACK PAIN: ICD-10-CM

## 2025-08-29 DIAGNOSIS — R22.1 MASS OF LEFT SIDE OF NECK: Primary | ICD-10-CM

## 2025-08-29 DIAGNOSIS — Z13.1 SCREENING FOR DIABETES MELLITUS: ICD-10-CM

## 2025-08-29 DIAGNOSIS — M54.6 CHRONIC RIGHT-SIDED THORACIC BACK PAIN: ICD-10-CM

## 2025-08-29 DIAGNOSIS — Z13.6 ENCOUNTER FOR SCREENING FOR CARDIOVASCULAR DISORDERS: ICD-10-CM

## 2025-08-29 PROBLEM — R73.03 PREDIABETES: Status: ACTIVE | Noted: 2025-08-29

## 2025-08-29 PROCEDURE — 99999 PR PBB SHADOW E&M-EST. PATIENT-LVL V: CPT | Mod: PBBFAC,,, | Performed by: FAMILY MEDICINE

## 2025-08-29 RX ORDER — NAPROXEN 500 MG/1
500 TABLET ORAL 2 TIMES DAILY PRN
Qty: 30 TABLET | Refills: 1 | Status: SHIPPED | OUTPATIENT
Start: 2025-08-29